# Patient Record
Sex: MALE | Race: WHITE | Employment: FULL TIME | URBAN - METROPOLITAN AREA
[De-identification: names, ages, dates, MRNs, and addresses within clinical notes are randomized per-mention and may not be internally consistent; named-entity substitution may affect disease eponyms.]

---

## 2018-04-10 ENCOUNTER — APPOINTMENT (OUTPATIENT)
Dept: MRI IMAGING | Age: 48
End: 2018-04-10
Attending: INTERNAL MEDICINE
Payer: COMMERCIAL

## 2018-04-10 ENCOUNTER — APPOINTMENT (OUTPATIENT)
Dept: GENERAL RADIOLOGY | Age: 48
End: 2018-04-10
Attending: EMERGENCY MEDICINE
Payer: COMMERCIAL

## 2018-04-10 ENCOUNTER — APPOINTMENT (OUTPATIENT)
Dept: CT IMAGING | Age: 48
End: 2018-04-10
Attending: INTERNAL MEDICINE
Payer: COMMERCIAL

## 2018-04-10 ENCOUNTER — APPOINTMENT (OUTPATIENT)
Dept: CT IMAGING | Age: 48
End: 2018-04-10
Attending: EMERGENCY MEDICINE
Payer: COMMERCIAL

## 2018-04-10 ENCOUNTER — HOSPITAL ENCOUNTER (OUTPATIENT)
Age: 48
Setting detail: OBSERVATION
Discharge: HOME | End: 2018-04-11
Attending: EMERGENCY MEDICINE | Admitting: INTERNAL MEDICINE
Payer: COMMERCIAL

## 2018-04-10 DIAGNOSIS — R20.0 RIGHT FACIAL NUMBNESS: ICD-10-CM

## 2018-04-10 DIAGNOSIS — R29.810 FACIAL WEAKNESS: Primary | ICD-10-CM

## 2018-04-10 PROBLEM — G45.9 TIA (TRANSIENT ISCHEMIC ATTACK): Status: ACTIVE | Noted: 2018-04-10

## 2018-04-10 PROBLEM — K21.9 GERD (GASTROESOPHAGEAL REFLUX DISEASE): Status: ACTIVE | Noted: 2018-04-10

## 2018-04-10 PROBLEM — K22.719 BARRETT'S ESOPHAGUS WITH DYSPLASIA: Status: ACTIVE | Noted: 2018-04-10

## 2018-04-10 LAB
ALBUMIN SERPL-MCNC: 3.4 G/DL (ref 3.5–5)
ALBUMIN/GLOB SERPL: 0.9 {RATIO} (ref 1.2–3.5)
ALP SERPL-CCNC: 114 U/L (ref 50–136)
ALT SERPL-CCNC: 25 U/L (ref 12–65)
ANION GAP SERPL CALC-SCNC: 10 MMOL/L (ref 7–16)
AST SERPL-CCNC: 19 U/L (ref 15–37)
ATRIAL RATE: 75 BPM
BASOPHILS # BLD: 0 K/UL (ref 0–0.2)
BASOPHILS NFR BLD: 0 % (ref 0–2)
BILIRUB SERPL-MCNC: 0.2 MG/DL (ref 0.2–1.1)
BUN SERPL-MCNC: 10 MG/DL (ref 6–23)
CALCIUM SERPL-MCNC: 8.5 MG/DL (ref 8.3–10.4)
CALCULATED P AXIS, ECG09: 70 DEGREES
CALCULATED R AXIS, ECG10: 83 DEGREES
CALCULATED T AXIS, ECG11: 128 DEGREES
CHLORIDE SERPL-SCNC: 105 MMOL/L (ref 98–107)
CO2 SERPL-SCNC: 26 MMOL/L (ref 21–32)
CREAT SERPL-MCNC: 1.08 MG/DL (ref 0.8–1.5)
DIAGNOSIS, 93000: NORMAL
DIFFERENTIAL METHOD BLD: ABNORMAL
EOSINOPHIL # BLD: 0.2 K/UL (ref 0–0.8)
EOSINOPHIL NFR BLD: 2 % (ref 0.5–7.8)
ERYTHROCYTE [DISTWIDTH] IN BLOOD BY AUTOMATED COUNT: 12.9 % (ref 11.9–14.6)
GLOBULIN SER CALC-MCNC: 3.8 G/DL (ref 2.3–3.5)
GLUCOSE SERPL-MCNC: 114 MG/DL (ref 65–100)
HCT VFR BLD AUTO: 44.3 % (ref 41.1–50.3)
HGB BLD-MCNC: 14.4 G/DL (ref 13.6–17.2)
IMM GRANULOCYTES # BLD: 0.1 K/UL (ref 0–0.5)
IMM GRANULOCYTES NFR BLD AUTO: 1 % (ref 0–5)
INR PPP: 0.9
LYMPHOCYTES # BLD: 2.9 K/UL (ref 0.5–4.6)
LYMPHOCYTES NFR BLD: 30 % (ref 13–44)
MCH RBC QN AUTO: 27.4 PG (ref 26.1–32.9)
MCHC RBC AUTO-ENTMCNC: 32.5 G/DL (ref 31.4–35)
MCV RBC AUTO: 84.2 FL (ref 79.6–97.8)
MONOCYTES # BLD: 0.6 K/UL (ref 0.1–1.3)
MONOCYTES NFR BLD: 6 % (ref 4–12)
NEUTS SEG # BLD: 6 K/UL (ref 1.7–8.2)
NEUTS SEG NFR BLD: 61 % (ref 43–78)
P-R INTERVAL, ECG05: 162 MS
PLATELET # BLD AUTO: 267 K/UL (ref 150–450)
PMV BLD AUTO: 10.4 FL (ref 10.8–14.1)
POTASSIUM SERPL-SCNC: 3.8 MMOL/L (ref 3.5–5.1)
PROT SERPL-MCNC: 7.2 G/DL (ref 6.3–8.2)
PROTHROMBIN TIME: 12.6 SEC (ref 11.5–14.5)
Q-T INTERVAL, ECG07: 372 MS
QRS DURATION, ECG06: 86 MS
QTC CALCULATION (BEZET), ECG08: 415 MS
RBC # BLD AUTO: 5.26 M/UL (ref 4.23–5.67)
SODIUM SERPL-SCNC: 141 MMOL/L (ref 136–145)
VENTRICULAR RATE, ECG03: 75 BPM
WBC # BLD AUTO: 9.8 K/UL (ref 4.3–11.1)

## 2018-04-10 PROCEDURE — A9577 INJ MULTIHANCE: HCPCS | Performed by: EMERGENCY MEDICINE

## 2018-04-10 PROCEDURE — 74011636320 HC RX REV CODE- 636/320: Performed by: EMERGENCY MEDICINE

## 2018-04-10 PROCEDURE — 70498 CT ANGIOGRAPHY NECK: CPT

## 2018-04-10 PROCEDURE — 77030019605

## 2018-04-10 PROCEDURE — 74011250636 HC RX REV CODE- 250/636: Performed by: EMERGENCY MEDICINE

## 2018-04-10 PROCEDURE — 85610 PROTHROMBIN TIME: CPT | Performed by: EMERGENCY MEDICINE

## 2018-04-10 PROCEDURE — 85025 COMPLETE CBC W/AUTO DIFF WBC: CPT | Performed by: EMERGENCY MEDICINE

## 2018-04-10 PROCEDURE — 99218 HC RM OBSERVATION: CPT

## 2018-04-10 PROCEDURE — 74011250637 HC RX REV CODE- 250/637: Performed by: INTERNAL MEDICINE

## 2018-04-10 PROCEDURE — 70450 CT HEAD/BRAIN W/O DYE: CPT

## 2018-04-10 PROCEDURE — 74011000258 HC RX REV CODE- 258: Performed by: EMERGENCY MEDICINE

## 2018-04-10 PROCEDURE — 86618 LYME DISEASE ANTIBODY: CPT | Performed by: PSYCHIATRY & NEUROLOGY

## 2018-04-10 PROCEDURE — 99285 EMERGENCY DEPT VISIT HI MDM: CPT | Performed by: EMERGENCY MEDICINE

## 2018-04-10 PROCEDURE — 71046 X-RAY EXAM CHEST 2 VIEWS: CPT

## 2018-04-10 PROCEDURE — 80053 COMPREHEN METABOLIC PANEL: CPT | Performed by: EMERGENCY MEDICINE

## 2018-04-10 PROCEDURE — 93005 ELECTROCARDIOGRAM TRACING: CPT | Performed by: EMERGENCY MEDICINE

## 2018-04-10 PROCEDURE — 70553 MRI BRAIN STEM W/O & W/DYE: CPT

## 2018-04-10 RX ORDER — TRAMADOL HYDROCHLORIDE 50 MG/1
50 TABLET ORAL
COMMUNITY

## 2018-04-10 RX ORDER — ASPIRIN 325 MG
325 TABLET ORAL DAILY
Status: DISCONTINUED | OUTPATIENT
Start: 2018-04-10 | End: 2018-04-11 | Stop reason: HOSPADM

## 2018-04-10 RX ORDER — SODIUM CHLORIDE 0.9 % (FLUSH) 0.9 %
10 SYRINGE (ML) INJECTION
Status: COMPLETED | OUTPATIENT
Start: 2018-04-10 | End: 2018-04-10

## 2018-04-10 RX ORDER — SODIUM CHLORIDE 0.9 % (FLUSH) 0.9 %
5-10 SYRINGE (ML) INJECTION EVERY 8 HOURS
Status: DISCONTINUED | OUTPATIENT
Start: 2018-04-10 | End: 2018-04-11 | Stop reason: HOSPADM

## 2018-04-10 RX ORDER — ATORVASTATIN CALCIUM 40 MG/1
40 TABLET, FILM COATED ORAL DAILY
Status: DISCONTINUED | OUTPATIENT
Start: 2018-04-10 | End: 2018-04-11 | Stop reason: HOSPADM

## 2018-04-10 RX ORDER — IBUPROFEN 200 MG
1 TABLET ORAL DAILY
Status: DISCONTINUED | OUTPATIENT
Start: 2018-04-10 | End: 2018-04-11 | Stop reason: HOSPADM

## 2018-04-10 RX ORDER — HEPARIN SODIUM 5000 [USP'U]/ML
5000 INJECTION, SOLUTION INTRAVENOUS; SUBCUTANEOUS EVERY 12 HOURS
Status: DISCONTINUED | OUTPATIENT
Start: 2018-04-10 | End: 2018-04-11 | Stop reason: HOSPADM

## 2018-04-10 RX ORDER — PANTOPRAZOLE SODIUM 40 MG/1
40 TABLET, DELAYED RELEASE ORAL
Status: DISCONTINUED | OUTPATIENT
Start: 2018-04-11 | End: 2018-04-11 | Stop reason: HOSPADM

## 2018-04-10 RX ORDER — SODIUM CHLORIDE 0.9 % (FLUSH) 0.9 %
5-10 SYRINGE (ML) INJECTION AS NEEDED
Status: DISCONTINUED | OUTPATIENT
Start: 2018-04-10 | End: 2018-04-11 | Stop reason: HOSPADM

## 2018-04-10 RX ADMIN — SODIUM CHLORIDE 100 ML: 900 INJECTION, SOLUTION INTRAVENOUS at 18:52

## 2018-04-10 RX ADMIN — GADOBENATE DIMEGLUMINE 20 ML: 529 INJECTION, SOLUTION INTRAVENOUS at 19:58

## 2018-04-10 RX ADMIN — ASPIRIN 325 MG ORAL TABLET 325 MG: 325 PILL ORAL at 21:50

## 2018-04-10 RX ADMIN — ATORVASTATIN CALCIUM 40 MG: 40 TABLET, FILM COATED ORAL at 20:26

## 2018-04-10 RX ADMIN — Medication 10 ML: at 19:58

## 2018-04-10 RX ADMIN — Medication 10 ML: at 21:51

## 2018-04-10 RX ADMIN — Medication 10 ML: at 18:52

## 2018-04-10 RX ADMIN — IOPAMIDOL 80 ML: 755 INJECTION, SOLUTION INTRAVENOUS at 18:52

## 2018-04-10 NOTE — IP AVS SNAPSHOT
Summary of Care Report The Summary of Care report has been created to help improve care coordination. Users with access to [facility] or [address] (Web-based application) may access additional patient information including the Discharge Summary. If you are not currently a [address] user and need more information, please call the number listed below in the [place] [address] section and ask to be connected with Medical Records. Facility Information Name Address Phone [address] [place] [address] [address] [place] [id number] [phone] Patient Information Patient Name Sex Warren Huston ROXANA Sr. ([id number]) Male 1970 Discharge Information Admitting Provider Service Area Unit Karen Dowling MD / [address] [place] [address] Intensive Care / [phone] Discharge Provider Discharge Date/Time Discharge Disposition Destination (none) 2018 (Pending) AHR (none) Patient Language Language ENGLISH [13] Hospital Problems as of 2018  Reviewed: 2018 12:26 PM by Karen Dowling MD  
  
  
  
 Class Noted - Resolved Last Modified POA Active Problems Munoz's esophagus with dysplasia  4/10/2018 - Present 4/10/2018 by Karen Dowling MD Unknown Entered by Karen Dowling MD  
  Bell's palsy  2018 - Present 2018 by Karen Dowling MD Unknown Entered by Karen Dowling MD  
  
Non-Hospital Problems as of 2018  Reviewed: 2018 12:26 PM by Karen Dowling MD  
  
  
  
 Class Noted - Resolved Last Modified Active Problems GERD (gastroesophageal reflux disease)  4/10/2018 - Present 2018 by Karen Dowling MD  
  Entered by Karen Dowling MD  
  
You are allergic to the following No active allergies Current Discharge Medication List  
  
START taking these medications Dose & Instructions Dispensing Information Comments acyclovir 200 mg capsule Commonly known as:  ZOVIRAX Dose:  200 mg Take 1 Cap by mouth five (5) times daily for 10 days. Quantity:  50 Cap Refills:  0  
   
 aspirin 81 mg chewable tablet Dose:  81 mg Take 1 Tab by mouth daily. Quantity:  30 Tab Refills:  0  
   
 atorvastatin 40 mg tablet Commonly known as:  LIPITOR Start taking on:  4/12/2018 Dose:  40 mg Take 1 Tab by mouth daily. Quantity:  30 Tab Refills:  0  
   
 polyvinyl alcohol 1.4 % ophthalmic solution Commonly known as:  Varinder Jackman Dose:  1 Drop Administer 1 Drop to both eyes as needed for up to 30 days. Quantity:  50 mL Refills:  0  
   
 predniSONE 20 mg tablet Commonly known as:  Devan Sewell Prednisolone (60 mg daily for five days, then taper by 10 mg daily, for a total treatment length of 10 days) Quantity:  24 Tab Refills:  0  
   
 white petrolatum-mineral oil 56.8-42.5 % ointment Commonly known as:  LACRILUBE S.O.P. Dose:  1 Each Administer 1 Each to both eyes every twelve (12) hours. Quantity:  1 Tube Refills:  1 CONTINUE these medications which have NOT CHANGED Dose & Instructions Dispensing Information Comments PriLOSEC 10 mg capsule Generic drug:  omeprazole Dose:  20 mg Take 20 mg by mouth two (2) times a day. Refills:  0  
   
 traMADol 50 mg tablet Commonly known as:  ULTRAM  
 Dose:  50 mg Take 50 mg by mouth every six (6) hours as needed for Pain. Refills:  0 STOP taking these medications Comments  
 ibuprofen 100 mg tablet Lortab 7.5 7.5-500 mg per tablet Generic drug:  HYDROcodone-acetaminophen ASK your doctor about these medications Dose & Instructions Dispensing Information Comments ZANAFLEX 4 mg capsule Generic drug:  tiZANidine Dose:  8 mg Take 8 mg by mouth two (2) times daily as needed. Refills:  0  
   
 ZANTAC 150 mg tablet Generic drug:  raNITIdine Dose:  150 mg Take 150 mg by mouth two (2) times a day. Refills:  0 Follow-up Information Follow up With Details Comments Contact Info Not On File [facility] In 1 week Hospital f/u. Bell's palsy dx. Keep appointment with Dr. Andrés Yang next week. Not On File (58) Patient has a PCP but that physician is not listed in [facility]. Discharge Instructions Bell's Palsy: Care Instructions Your Care Instructions Bell's palsy is paralysis or weakness of the muscles on one side of the face. Often people with Bell's palsy have a droop on one side of the mouth and have trouble completely shutting the eye on the same side. Bell's palsy can also interfere with the sense of taste. These things happen when a nerve in your face becomes inflamed. Bell's palsy is not caused by a stroke. The cause of the nerve inflammation is not known. But some experts think that a virus may cause it. Because of this, doctors sometimes prescribe antiviral medicine to treat it. You also may get medicine to reduce swelling. Bell's palsy usually gets better on its own in a few weeks or months. Follow-up care is a key part of your treatment and safety. Be sure to make and go to all appointments, and call your doctor if you are having problems. It's also a good idea to know your test results and keep a list of the medicines you take. How can you care for yourself at home? · Take your medicines exactly as prescribed. Call your doctor if you think you are having a problem with your medicine. You will get more details on the specific medicines your doctor prescribes. · Use artificial tears or ointment if your eyes are too dry. Bell's palsy can make your lower eyelid droop, causing a dry eye. · If you cannot completely close your eye, consider using an eye patch while you sleep. · Help yourself blink by using your finger to close and open your eyelid.  This may help keep your eye moist. 
 · Wear glasses or goggles to keep dust and dirt out of your eye. · As feeling comes back to your face, massage your forehead, cheeks, and lips. Massage may make the muscles in your face stronger. · Brush and floss your teeth often to help prevent tooth decay. Bell's palsy can dry up the spit on one side of your mouth. This increases the risk of tooth decay. When should you call for help? Call 911 anytime you think you may need emergency care. For example, call if: 
? · You have symptoms of a stroke. These may include: 
¨ Sudden numbness, tingling, weakness, or loss of movement in your face, arm, or leg, especially on only one side of your body. ¨ Sudden vision changes. ¨ Sudden trouble speaking. ¨ Sudden confusion or trouble understanding simple statements. ¨ Sudden problems with walking or balance. ¨ A sudden, severe headache that is different from past headaches. ?Call your doctor now or seek immediate medical care if: 
? · You have numbness or weakness that spreads beyond one side of your face. ? · You have a skin rash or eye pain or redness, or light bothers your eyes. ? · You have a new or worse headache. ? Watch closely for changes in your health, and be sure to contact your doctor if: 
? · You do not get better as expected. Where can you learn more? Go to http://tramaine-sue.info/. Enter P168 in the search box to learn more about \"Bell's Palsy: Care Instructions. \" Current as of: October 14, 2016 Content Version: 11.4 © 4141-0684 [employer]. Care instructions adapted under license by [employer] (which disclaims liability or warranty for this information). If you have questions about a medical condition or this instruction, always ask your healthcare professional. [place] [address] any warranty or liability for your use of this information. Transient Ischemic Attack: Care Instructions Your Care Instructions A transient ischemic attack (TIA) is when blood flow to a part of your brain is blocked for a short time. A TIA is like a stroke but usually lasts only a few minutes. A TIA does not cause lasting brain damage. Any vision problems, slurred speech, or other symptoms usually go away in 10 to 20 minutes. But they may last for up to 24 hours. TIAs are often warning signs of a stroke. Some people who have a TIA may have a stroke in the future. A stroke can cause symptoms like those of a TIA. But a stroke causes lasting damage to your brain. You can take steps to help prevent a stroke. One thing you can do is get early treatment. If you have other new symptoms, or if your symptoms do not get better, go back to the emergency room or call your doctor right away. Getting treatment right away may prevent long-term brain damage caused by a stroke. The doctor has checked you carefully, but problems can develop later. If you notice any problems or new symptoms, get medical treatment right away. Follow-up care is a key part of your treatment and safety. Be sure to make and go to all appointments, and call your doctor if you are having problems. It's also a good idea to know your test results and keep a list of the medicines you take. How can you care for yourself at home? Medicines ? · Be safe with medicines. Take your medicines exactly as prescribed. Call your doctor if you think you are having a problem with your medicine. ? · If you take a blood thinner, such as aspirin, be sure you get instructions about how to take your medicine safely. Blood thinners can cause serious bleeding problems. ? · Call your doctor if you are not able to take your medicines for any reason. ? · Do not take any over-the-counter medicines or herbal products without talking to your doctor first.  
? · If you take birth control pills or hormone therapy, talk to your doctor. Ask if these treatments are right for you. ? Lifestyle changes ? · Do not smoke. If you need help quitting, talk to your doctor about stop-smoking programs and medicines. ? · Be active. If your doctor recommends it, get more exercise. Walking is a good choice. Bit by bit, increase the amount you walk every day. Try for at least 30 minutes on most days of the week. You also may want to swim, bike, or do other activities. ? · Eat heart-healthy foods. These include fruits, vegetables, high-fiber foods, fish, and foods that are low in sodium, saturated fat, and trans fat. ? · Stay at a healthy weight. Lose weight if you need to.  
? · Limit alcohol to 2 drinks a day for men and 1 drink a day for women. ?Staying healthy ? · Manage other health problems such as diabetes, high blood pressure, and high cholesterol. ? · Get the flu vaccine every year. When should you call for help? Call 911 anytime you think you may need emergency care. For example, call if: 
? · You have new or worse symptoms of a stroke. These may include: 
¨ Sudden numbness, tingling, weakness, or loss of movement in your face, arm, or leg, especially on only one side of your body. ¨ Sudden vision changes. ¨ Sudden trouble speaking. ¨ Sudden confusion or trouble understanding simple statements. ¨ Sudden problems with walking or balance. ¨ A sudden, severe headache that is different from past headaches. Call 911 even if these symptoms go away in a few minutes. ? · You feel like you are having another TIA. ? Watch closely for changes in your health, and be sure to contact your doctor if you have any problems. Where can you learn more? Go to http://tramaine-sue.info/. Enter [phone] [id number] [id number] in the search box to learn more about \"Transient Ischemic Attack: Care Instructions. \" Current as of: March 20, 2017 Content Version: 11.4 © 3493-5174 [employer].  Care instructions adapted under license by [employer] (which disclaims liability or warranty for this information). If you have questions about a medical condition or this instruction, always ask your healthcare professional. [address] any warranty or liability for your use of this information. DISCHARGE SUMMARY from Nurse PATIENT INSTRUCTIONS: 
 
 
F-face looks uneven A-arms unable to move or move unevenly S-speech slurred or non-existent T-time-call 911 as soon as signs and symptoms begin-DO NOT go Back to bed or wait to see if you get better-TIME IS BRAIN. Warning Signs of HEART ATTACK Call 911 if you have these symptoms: 
? Chest discomfort. Most heart attacks involve discomfort in the center of the chest that lasts more than a few minutes, or that goes away and comes back. It can feel like uncomfortable pressure, squeezing, fullness, or pain. ? Discomfort in other areas of the upper body. Symptoms can include pain or discomfort in one or both arms, the back, neck, jaw, or stomach. ? Shortness of breath with or without chest discomfort. ? Other signs may include breaking out in a cold sweat, nausea, or lightheadedness. Don't wait more than five minutes to call [address]! Fast action can save your life. Calling 911 is almost always the fastest way to get lifesaving treatment. Emergency Medical Services staff can begin treatment when they arrive  up to an hour sooner than if someone gets to the hospital by car. The discharge information has been reviewed with the patient. The patient verbalized understanding. Discharge medications reviewed with the patient and appropriate educational materials and side effects teaching were provided. ___________________________________________________________________________________________________________________________________ Chart Review Routing History No Routing History on File

## 2018-04-10 NOTE — ED TRIAGE NOTES
Patient arrives from work with complaint of right sided facial numbness and facial muscle weakness that was present when he woke up this morning at 02:00 a.m. 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. He also complains of pain behind his right ear. Patient displays with right sided deficit to face. No other neurological deficits present.

## 2018-04-10 NOTE — ED PROVIDER NOTES
HPI Comments: Patient with history of heart disease. 2 days ago started with right-sided tongue numbness. It has slowly spread to the right side of his face. Symptoms were more severe when he woke at 2 AM this morning. He denies any numbness or weakness in his arms or legs. No blurry vision or headache. Does have some right posterior ear pain. Denies any slurred speech or confusion. States this morning when he is trying to eat food was falling of his mouth. Patient is a 41 y.o. male presenting with numbness. The history is provided by the patient. No  was used. Numbness   This is a new problem. The current episode started 2 days ago. The problem has been gradually worsening. There was right facial focality noted. Primary symptoms include loss of sensation. Pertinent negatives include no focal weakness, no loss of balance, no slurred speech, no speech difficulty, no memory loss, no movement disorder, no agitation, no visual change, no auditory change, no mental status change, no unresponsiveness and no disorientation. There has been no fever. Pertinent negatives include no shortness of breath, no chest pain, no vomiting, no altered mental status, no confusion, no headaches, no nausea, no bowel incontinence and no bladder incontinence. Past Medical History:   Diagnosis Date    CAD (coronary artery disease)     Gastrointestinal disorder     gerd    Other ill-defined conditions(229.07)     slipped disk.  Psychiatric disorder     anxiety       Past Surgical History:   Procedure Laterality Date    HX ORTHOPAEDIC      bilateral knee, right hand         History reviewed. No pertinent family history. Social History     Social History    Marital status: LEGALLY      Spouse name: N/A    Number of children: N/A    Years of education: N/A     Occupational History    Not on file.      Social History Main Topics    Smoking status: Current Every Day Smoker     Packs/day: 1.50    Smokeless tobacco: Never Used    Alcohol use No    Drug use: No    Sexual activity: Yes     Other Topics Concern    Not on file     Social History Narrative         ALLERGIES: Review of patient's allergies indicates no known allergies. Review of Systems   Constitutional: Negative for chills and fever. HENT: Positive for ear pain. Negative for rhinorrhea and sore throat. Eyes: Negative for pain and redness. Respiratory: Negative for chest tightness, shortness of breath and wheezing. Cardiovascular: Negative for chest pain and leg swelling. Gastrointestinal: Negative for abdominal pain, bowel incontinence, diarrhea, nausea and vomiting. Genitourinary: Negative for bladder incontinence, dysuria and hematuria. Musculoskeletal: Negative for back pain, gait problem, neck pain and neck stiffness. Skin: Negative for color change and rash. Neurological: Positive for facial asymmetry and numbness. Negative for focal weakness, speech difficulty, weakness, headaches and loss of balance. Psychiatric/Behavioral: Negative for agitation, confusion and memory loss. Vitals:    04/10/18 1533   BP: 134/83   Pulse: (!) 102   Resp: 18   Temp: 97.9 °F (36.6 °C)   SpO2: 97%   Weight: 105.7 kg (233 lb)   Height: 5' 10\" (1.778 m)            Physical Exam   Constitutional: He is oriented to person, place, and time. He appears well-developed and well-nourished. HENT:   Head: Normocephalic and atraumatic. Right Ear: External ear normal.   Left Ear: External ear normal.   Mouth/Throat: Oropharynx is clear and moist.   Eyes: Conjunctivae and EOM are normal. Pupils are equal, round, and reactive to light. Neck: Normal range of motion. Neck supple. Cardiovascular: Normal rate and regular rhythm. No murmur heard. Pulmonary/Chest: Effort normal and breath sounds normal. He has no wheezes. Abdominal: Soft. Bowel sounds are normal. There is no tenderness.    Musculoskeletal: Normal range of motion. He exhibits no edema. Neurological: He is alert and oriented to person, place, and time. A cranial nerve deficit (right facial droop and weakness but no involvement of forehead. ) is present. He exhibits normal muscle tone. Coordination normal.   Skin: Skin is warm and dry. Nursing note and vitals reviewed. MDM  Number of Diagnoses or Management Options  Diagnosis management comments: CT negative. No forehead involvement. Will admit for further stroke workup. Amount and/or Complexity of Data Reviewed  Clinical lab tests: ordered and reviewed  Tests in the radiology section of CPT®: ordered and reviewed    Patient Progress  Patient progress: stable        ED Course       Procedures            CT HEAD WO CONT (Final result) Result time: 04/10/18 16:18:20     Final result by Rosy Lee MD (04/10/18 16:18:20)     Impression:     IMPRESSION:  No acute CT findings in the brain.           Narrative:     CT SCAN OF THE BRAIN    4/10/2018    Indication:  Right facial numbness    TECHNIQUE:  Axial scans from skull base to vertex.  No contrast.  Radiation dose  reduction techniques were used for this study:  All CT scans performed at this  facility use one or all of the following: Automated exposure control, adjustment  of the mA and/or kVp according to patient's size, iterative reconstruction. COMPARISON: Alan Gens are no recent previous comparable exams on this PACs system. Findings: Alan Gens is no evidence for mass, acute hemorrhage, or midline shift of  the brain.  Ventricles are normal size.  No abnormal fluid collection is  identified.  No definite evidence for acute major arterial territory infarct. Skull appears intact.  Mucosal thickening noted paranasal sinuses.                 XR CHEST PA LAT (Final result) Result time: 04/10/18 16:09:32     Final result by Alem Palacio DO (04/10/18 16:09:32)     Impression:     Impression: No active disease in the chest.           Narrative:     Two view chest:  04/10/2018    History: Acute right facial numbness. Comparison: None    Findings: The heart and mediastinal silhouette are normal in size and  configuration. The lungs and pleural spaces are clear. The pulmonary vascularity  is within normal limits. The visualized osseous structures are unremarkable.           Results Include:    Recent Results (from the past 24 hour(s))   CBC WITH AUTOMATED DIFF    Collection Time: 04/10/18  4:36 PM   Result Value Ref Range    WBC 9.8 4.3 - 11.1 K/uL    RBC 5.26 4.23 - 5.67 M/uL    HGB 14.4 13.6 - 17.2 g/dL    HCT 44.3 41.1 - 50.3 %    MCV 84.2 79.6 - 97.8 FL    MCH 27.4 26.1 - 32.9 PG    MCHC 32.5 31.4 - 35.0 g/dL    RDW 12.9 11.9 - 14.6 %    PLATELET 394 696 - 020 K/uL    MPV 10.4 (L) 10.8 - 14.1 FL    DF AUTOMATED      NEUTROPHILS 61 43 - 78 %    LYMPHOCYTES 30 13 - 44 %    MONOCYTES 6 4.0 - 12.0 %    EOSINOPHILS 2 0.5 - 7.8 %    BASOPHILS 0 0.0 - 2.0 %    IMMATURE GRANULOCYTES 1 0.0 - 5.0 %    ABS. NEUTROPHILS 6.0 1.7 - 8.2 K/UL    ABS. LYMPHOCYTES 2.9 0.5 - 4.6 K/UL    ABS. MONOCYTES 0.6 0.1 - 1.3 K/UL    ABS. EOSINOPHILS 0.2 0.0 - 0.8 K/UL    ABS. BASOPHILS 0.0 0.0 - 0.2 K/UL    ABS. IMM. GRANS. 0.1 0.0 - 0.5 K/UL   METABOLIC PANEL, COMPREHENSIVE    Collection Time: 04/10/18  4:36 PM   Result Value Ref Range    Sodium 141 136 - 145 mmol/L    Potassium 3.8 3.5 - 5.1 mmol/L    Chloride 105 98 - 107 mmol/L    CO2 26 21 - 32 mmol/L    Anion gap 10 7 - 16 mmol/L    Glucose 114 (H) 65 - 100 mg/dL    BUN 10 6 - 23 MG/DL    Creatinine 1.08 0.8 - 1.5 MG/DL    GFR est AA >60 >60 ml/min/1.73m2    GFR est non-AA >60 >60 ml/min/1.73m2    Calcium 8.5 8.3 - 10.4 MG/DL    Bilirubin, total 0.2 0.2 - 1.1 MG/DL    ALT (SGPT) 25 12 - 65 U/L    AST (SGOT) 19 15 - 37 U/L    Alk.  phosphatase 114 50 - 136 U/L    Protein, total 7.2 6.3 - 8.2 g/dL    Albumin 3.4 (L) 3.5 - 5.0 g/dL    Globulin 3.8 (H) 2.3 - 3.5 g/dL    A-G Ratio 0.9 (L) 1.2 - 3.5     PROTHROMBIN TIME + INR    Collection Time: 04/10/18  4:36 PM   Result Value Ref Range    Prothrombin time 12.6 11.5 - 14.5 sec    INR 0.9

## 2018-04-10 NOTE — IP AVS SNAPSHOT
[address] [facility] 
 
 
 [address] [id number] [facility] 
[phone] Patient: Ray Chang. MRN: [id number] NOU:[id number] About your hospitalization You were admitted on:  April 10, 2018 You last received care in the:  [facility] 3 ICU You were discharged on:  April 11, 2018 Why you were hospitalized Your primary diagnosis was:  Not on File Your diagnoses also included:  Munoz's Esophagus With Dysplasia, Bell's Palsy Follow-up Information Follow up With Details Comments Contact Info Not On File B[facility] In 1 week Hospital f/u. Bell's palsy dx. Keep appointment with Dr. Gabby Ferraro next week. Not On File (58) Patient has a PCP but that physician is not listed in [address] [place] [address]. Discharge Orders None A check checo indicates which time of day the medication should be taken. My Medications START taking these medications Instructions Each Dose to Equal  
 Morning Noon Evening Bedtime  
 acyclovir 200 mg capsule Commonly known as:  ZOVIRAX Your last dose was: Your next dose is: Take 1 Cap by mouth five (5) times daily for 10 days. 200 mg  
    
   
   
   
  
 aspirin 81 mg chewable tablet Your last dose was: Your next dose is: Take 1 Tab by mouth daily. 81 mg  
    
   
   
   
  
 atorvastatin 40 mg tablet Commonly known as:  LIPITOR Start taking on:  4/12/2018 Your last dose was: Your next dose is: Take 1 Tab by mouth daily. 40 mg  
    
   
   
   
  
 polyvinyl alcohol 1.4 % ophthalmic solution Commonly known as:  Kierra Marizol Your last dose was: Your next dose is:    
   
   
 Administer 1 Drop to both eyes as needed for up to 30 days. 1 Drop  
    
   
   
   
  
 predniSONE 20 mg tablet Commonly known as:  Jaime Noss Your last dose was: Your next dose is: Prednisolone (60 mg daily for five days, then taper by 10 mg daily, for a total treatment length of 10 days)  
     
   
   
   
  
 white petrolatum-mineral oil 56.8-42.5 % ointment Commonly known as:  LACRILUBE S.O.P. Your last dose was: Your next dose is:    
   
   
 Administer 1 Each to both eyes every twelve (12) hours. 1 Each CONTINUE taking these medications Instructions Each Dose to Equal  
 Morning Noon Evening Bedtime PriLOSEC 10 mg capsule Generic drug:  omeprazole Your last dose was: Your next dose is: Take 20 mg by mouth two (2) times a day. 20 mg  
    
   
   
   
  
 traMADol 50 mg tablet Commonly known as:  ULTRAM  
   
Your last dose was: Your next dose is: Take 50 mg by mouth every six (6) hours as needed for Pain. 50 mg  
    
   
   
   
  
  
STOP taking these medications   
 ibuprofen 100 mg tablet Lortab 7.5 7.5-500 mg per tablet Generic drug:  HYDROcodone-acetaminophen ASK your doctor about these medications Instructions Each Dose to Equal  
 Morning Noon Evening Bedtime ZANAFLEX 4 mg capsule Generic drug:  tiZANidine Your last dose was: Your next dose is: Take 8 mg by mouth two (2) times daily as needed. 8 mg ZANTAC 150 mg tablet Generic drug:  raNITIdine Your last dose was: Your next dose is: Take 150 mg by mouth two (2) times a day. 150 mg Where to Get Your Medications Information on where to get these meds will be given to you by the nurse or doctor. ! Ask your nurse or doctor about these medications  
  acyclovir 200 mg capsule  
 aspirin 81 mg chewable tablet  
 atorvastatin 40 mg tablet  
 polyvinyl alcohol 1.4 % ophthalmic solution  
 predniSONE 20 mg tablet  
 white petrolatum-mineral oil 56.8-42.5 % ointment Opioid Education Prescription Opioids: What You Need to Know: 
 
Prescription opioids can be used to help relieve moderate-to-severe pain and are often prescribed following a surgery or injury, or for certain health conditions. These medications can be an important part of treatment but also come with serious risks. Opioids are strong pain medicines. Examples include hydrocodone, oxycodone, fentanyl, and morphine. Heroin is an example of an illegal opioid. It is important to work with your health care provider to make sure you are getting the safest, most effective care. WHAT ARE THE RISKS AND SIDE EFFECTS OF OPIOID USE? Prescription opioids carry serious risks of addiction and overdose, especially with prolonged use. An opioid overdose, often marked by slow breathing, can cause sudden death. The use of prescription opioids can have a number of side effects as well, even when taken as directed. · Tolerance-meaning you might need to take more of a medication for the same pain relief · Physical dependence-meaning you have symptoms of withdrawal when the medication is stopped. Withdrawal symptoms can include nausea, sweating, chills, diarrhea, stomach cramps, and muscle aches. Withdrawal can last up to several weeks, depending on which drug you took and how long you took it. · Increased sensitivity to pain · Constipation · Nausea, vomiting, and dry mouth · Sleepiness and dizziness · Confusion · Depression · Low levels of testosterone that can result in lower sex drive, energy, and strength · Itching and sweating RISKS ARE GREATER WITH:      
· History of drug misuse, substance use disorder, or overdose · Mental health conditions (such as depression or anxiety) · Sleep apnea · Older age (72 years or older) · Pregnancy Avoid alcohol while taking prescription opioids. Also, unless specifically advised by your health care provider, medications to avoid include: · Benzodiazepines (such as Xanax or Valium) · Muscle relaxants (such as Soma or Flexeril) · Hypnotics (such as Ambien or Lunesta) · Other prescription opioids KNOW YOUR OPTIONS Talk to your health care provider about ways to manage your pain that don't involve prescription opioids. Some of these options may actually work better and have fewer risks and side effects. Options may include: 
· Pain relievers such as acetaminophen, ibuprofen, and naproxen · Some medications that are also used for depression or seizures · Physical therapy and exercise · Counseling to help patients learn how to cope better with triggers of pain and stress. · Application of heat or cold compress · Massage therapy · Relaxation techniques Be Informed Make sure you know the name of your medication, how much and how often to take it, and its potential risks & side effects. IF YOU ARE PRESCRIBED OPIOIDS FOR PAIN: 
· Never take opioids in greater amounts or more often than prescribed. Remember the goal is not to be pain-free but to manage your pain at a tolerable level. · Follow up with your primary care provider to: · Work together to create a plan on how to manage your pain. · Talk about ways to help manage your pain that don't involve prescription opioids. · Talk about any and all concerns and side effects. · Help prevent misuse and abuse. · Never sell or share prescription opioids · Help prevent misuse and abuse. · Store prescription opioids in a secure place and out of reach of others (this may include visitors, children, friends, and family). · Safely dispose of unused/unwanted prescription opioids: Find your community drug take-back program or your pharmacy mail-back program, or flush them down the toilet, following guidance from the Food and Drug Administration (www.fda.gov/Drugs/ResourcesForYou). · Visit www.cdc.gov/drugoverdose to learn about the risks of opioid abuse and overdose. · If you believe you may be struggling with addiction, tell your health care provider and ask for guidance or call [employer] at 8-435-140-IGDB. Discharge Instructions Bell's Palsy: Care Instructions Your Care Instructions Bell's palsy is paralysis or weakness of the muscles on one side of the face. Often people with Bell's palsy have a droop on one side of the mouth and have trouble completely shutting the eye on the same side. Bell's palsy can also interfere with the sense of taste. These things happen when a nerve in your face becomes inflamed. Bell's palsy is not caused by a stroke. The cause of the nerve inflammation is not known. But some experts think that a virus may cause it. Because of this, doctors sometimes prescribe antiviral medicine to treat it. You also may get medicine to reduce swelling. Bell's palsy usually gets better on its own in a few weeks or months. Follow-up care is a key part of your treatment and safety. Be sure to make and go to all appointments, and call your doctor if you are having problems. It's also a good idea to know your test results and keep a list of the medicines you take. How can you care for yourself at home? · Take your medicines exactly as prescribed. Call your doctor if you think you are having a problem with your medicine. You will get more details on the specific medicines your doctor prescribes. · Use artificial tears or ointment if your eyes are too dry. Bell's palsy can make your lower eyelid droop, causing a dry eye. · If you cannot completely close your eye, consider using an eye patch while you sleep. · Help yourself blink by using your finger to close and open your eyelid. This may help keep your eye moist. 
· Wear glasses or goggles to keep dust and dirt out of your eye.  
· As feeling comes back to your face, massage your forehead, cheeks, and lips. Massage may make the muscles in your face stronger. · Brush and floss your teeth often to help prevent tooth decay. Bell's palsy can dry up the spit on one side of your mouth. This increases the risk of tooth decay. When should you call for help? Call 911 anytime you think you may need emergency care. For example, call if: 
? · You have symptoms of a stroke. These may include: 
¨ Sudden numbness, tingling, weakness, or loss of movement in your face, arm, or leg, especially on only one side of your body. ¨ Sudden vision changes. ¨ Sudden trouble speaking. ¨ Sudden confusion or trouble understanding simple statements. ¨ Sudden problems with walking or balance. ¨ A sudden, severe headache that is different from past headaches. ?Call your doctor now or seek immediate medical care if: 
? · You have numbness or weakness that spreads beyond one side of your face. ? · You have a skin rash or eye pain or redness, or light bothers your eyes. ? · You have a new or worse headache. ? Watch closely for changes in your health, and be sure to contact your doctor if: 
? · You do not get better as expected. Where can you learn more? Go to http://tramaine-sue.info/. Enter P168 in the search box to learn more about \"Bell's Palsy: Care Instructions. \" Current as of: October 14, 2016 Content Version: 11.4 © 1205-4995 LoSo. Care instructions adapted under license by [employer] (which disclaims liability or warranty for this information). If you have questions about a medical condition or this instruction, always ask your healthcare professional. [place] [address] any warranty or liability for your use of this information. Transient Ischemic Attack: Care Instructions Your Care Instructions A transient ischemic attack (TIA) is when blood flow to a part of your brain is blocked for a short time.  A TIA is like a stroke but usually lasts only a few minutes. A TIA does not cause lasting brain damage. Any vision problems, slurred speech, or other symptoms usually go away in 10 to 20 minutes. But they may last for up to 24 hours. TIAs are often warning signs of a stroke. Some people who have a TIA may have a stroke in the future. A stroke can cause symptoms like those of a TIA. But a stroke causes lasting damage to your brain. You can take steps to help prevent a stroke. One thing you can do is get early treatment. If you have other new symptoms, or if your symptoms do not get better, go back to the emergency room or call your doctor right away. Getting treatment right away may prevent long-term brain damage caused by a stroke. The doctor has checked you carefully, but problems can develop later. If you notice any problems or new symptoms, get medical treatment right away. Follow-up care is a key part of your treatment and safety. Be sure to make and go to all appointments, and call your doctor if you are having problems. It's also a good idea to know your test results and keep a list of the medicines you take. How can you care for yourself at home? Medicines ? · Be safe with medicines. Take your medicines exactly as prescribed. Call your doctor if you think you are having a problem with your medicine. ? · If you take a blood thinner, such as aspirin, be sure you get instructions about how to take your medicine safely. Blood thinners can cause serious bleeding problems. ? · Call your doctor if you are not able to take your medicines for any reason. ? · Do not take any over-the-counter medicines or herbal products without talking to your doctor first.  
? · If you take birth control pills or hormone therapy, talk to your doctor. Ask if these treatments are right for you. ? Lifestyle changes ? · Do not smoke. If you need help quitting, talk to your doctor about stop-smoking programs and medicines. ? · Be active. If your doctor recommends it, get more exercise. Walking is a good choice. Bit by bit, increase the amount you walk every day. Try for at least 30 minutes on most days of the week. You also may want to swim, bike, or do other activities. ? · Eat heart-healthy foods. These include fruits, vegetables, high-fiber foods, fish, and foods that are low in sodium, saturated fat, and trans fat. ? · Stay at a healthy weight. Lose weight if you need to.  
? · Limit alcohol to 2 drinks a day for men and 1 drink a day for women. ?Staying healthy ? · Manage other health problems such as diabetes, high blood pressure, and high cholesterol. ? · Get the flu vaccine every year. When should you call for help? Call 911 anytime you think you may need emergency care. For example, call if: 
? · You have new or worse symptoms of a stroke. These may include: 
¨ Sudden numbness, tingling, weakness, or loss of movement in your face, arm, or leg, especially on only one side of your body. ¨ Sudden vision changes. ¨ Sudden trouble speaking. ¨ Sudden confusion or trouble understanding simple statements. ¨ Sudden problems with walking or balance. ¨ A sudden, severe headache that is different from past headaches. Call 911 even if these symptoms go away in a few minutes. ? · You feel like you are having another TIA. ? Watch closely for changes in your health, and be sure to contact your doctor if you have any problems. Where can you learn more? Go to http://tramaine-sue.info/. Enter [phone] [id number] [id number] in the search box to learn more about \"Transient Ischemic Attack: Care Instructions. \" Current as of: March 20, 2017 Content Version: 11.4 © 3585-8078 [employer] Grind. Care instructions adapted under license by [employer] (which disclaims liability or warranty for this information).  If you have questions about a medical condition or this instruction, always ask your healthcare professional. [place] [address] any warranty or liability for your use of this information. DISCHARGE SUMMARY from Nurse PATIENT INSTRUCTIONS: 
 
 
F-face looks uneven A-arms unable to move or move unevenly S-speech slurred or non-existent T-time-call 911 as soon as signs and symptoms begin-DO NOT go Back to bed or wait to see if you get better-TIME IS BRAIN. Warning Signs of HEART ATTACK Call 911 if you have these symptoms: 
? Chest discomfort. Most heart attacks involve discomfort in the center of the chest that lasts more than a few minutes, or that goes away and comes back. It can feel like uncomfortable pressure, squeezing, fullness, or pain. ? Discomfort in other areas of the upper body. Symptoms can include pain or discomfort in one or both arms, the back, neck, jaw, or stomach. ? Shortness of breath with or without chest discomfort. ? Other signs may include breaking out in a cold sweat, nausea, or lightheadedness. Don't wait more than five minutes to call [address]! Fast action can save your life. Calling 911 is almost always the fastest way to get lifesaving treatment. Emergency Medical Services staff can begin treatment when they arrive  up to an hour sooner than if someone gets to the hospital by car. The discharge information has been reviewed with the patient. The patient verbalized understanding. Discharge medications reviewed with the patient and appropriate educational materials and side effects teaching were provided. ___________________________________________________________________________________________________________________________________ MyChart Announcement We are excited to announce that we are making your provider's discharge notes available to you in Omnireliant. You will see these notes when they are completed and signed by the physician that discharged you from your recent hospital stay. If you have any questions or concerns about any information you see in Omnireliant, please call the Health Information Department where you were seen or reach out to your Primary Care Provider for more information about your plan of care. Introducing [facility]! Chinedu Antony introduces Omnireliant patient portal. Now you can access parts of your medical record, email your doctor's office, and request medication refills online. 1. In your internet browser, go to [email or web address] 2. Click on the First Time User? Click Here link in the Sign In box. You will see the New Member Sign Up page. 3. Enter your Omnireliant Access Code exactly as it appears below. You will not need to use this code after youve completed the sign-up process. If you do not sign up before the expiration date, you must request a new code. · Omnireliant Access Code: [email or web address] Expires: 7/9/2018  3:25 PM 
 
4. Enter the last four digits of your Social Security Number (xxxx) and Date of Birth (mm/dd/yyyy) as indicated and click Submit. You will be taken to the next sign-up page. 5. Create a Omnireliant ID. This will be your Omnireliant login ID and cannot be changed, so think of one that is secure and easy to remember. 6. Create a Omnireliant password. You can change your password at any time. 7. Enter your Password Reset Question and Answer. This can be used at a later time if you forget your password. 8. Enter your e-mail address. You will receive e-mail notification when new information is available in [address] 9. Click Sign Up. You can now view and download portions of your medical record.  
10. Click the Download Summary menu link to download a portable copy of your medical information. If you have questions, please visit the Frequently Asked Questions section of the Ramamiahart website. Remember, Jigsee is NOT to be used for urgent needs. For medical emergencies, dial 911. Now available from your iPhone and Android! Introducing Luciano Oglesby As a [facility] patient, I wanted to make you aware of our electronic visit tool called Luciano Oglesby. VirtualScopicsrichardmb 24/7 allows you to connect within minutes with a medical provider 24 hours a day, seven days a week via a mobile device or tablet or logging into a secure website from your computer. You can access Luciano Oglesby from anywhere in the [place]. A virtual visit might be right for you when you have a simple condition and feel like you just dont want to get out of bed, or cant get away from work for an appointment, when your regular [facility] provider is not available (evenings, weekends or holidays), or when youre out of town and need minor care. Electronic visits cost only $49 and if the [facility] 24/7 provider determines a prescription is needed to treat your condition, one can be electronically transmitted to a nearby pharmacy*. Please take a moment to enroll today if you have not already done so. The enrollment process is free and takes just a few minutes. To enroll, please download the iCentera 24/7 leo to your tablet or phone, or visit [email or web address] to enroll on your computer. And, as an [address] [place] [address] patient with a [employer] account, the results of your visits will be scanned into your electronic medical record and your primary care provider will be able to view the scanned results. We urge you to continue to see your regular [facility] provider for your ongoing medical care.   And while your primary care provider may not be the one available when you seek a Luciano Oglesby virtual visit, the peace of mind you get from getting a real diagnosis real time can be priceless. For more information on Luciano Maloneerasmofin, view our Frequently Asked Questions (FAQs) at [email or web address]. Sincerely, 
 
Faith Ceja MD 
Chief Medical Officer [address] *:  certain medications cannot be prescribed via Luciano Oglesby Unresulted Labs-Please follow up with your PCP about these lab tests Order Current Status LYME AB TOTAL W/RFLX W BLOT In process Providers Seen During Your Hospitalization Provider Specialty Primary office phone Jose Vasquez MD Emergency Medicine [phone] Yari Gamboa MD Internal Medicine [phone] Your Primary Care Physician (PCP) Primary Care Physician Office Phone Office Fax UNKNOWN, PROVIDER ** None ** ** None ** You are allergic to the following No active allergies Recent Documentation Height Weight BMI Smoking Status 1.778 m 105.4 kg 33.33 kg/m2 Current Every Day Smoker Emergency Contacts Name Discharge Info Relation Home Work Mobile ADITHYA CALERO  Spouse [3] [phone] Patient Belongings The following personal items are in your possession at time of discharge: 
  Dental Appliances: None  Visual Aid: None      Home Medications: None   Jewelry: Earrings, Necklace (metal earring/ metal necklace/ tongue ring)  Clothing: Footwear, Pants, Shirt, Undergarments    Other Valuables: Cell Phone, Wallet  Personal Items Sent to Safe: none Please provide this summary of care documentation to your next provider. Signatures-by signing, you are acknowledging that this After Visit Summary has been reviewed with you and you have received a copy. Patient Signature:  ____________________________________________________________ Date:  ____________________________________________________________  
  
Davida Chanell  Provider Signature: ____________________________________________________________ Date:  ____________________________________________________________

## 2018-04-10 NOTE — H&P
Hospitalist H&P Note     Admit Date:  4/10/2018  3:40 PM   Name:  Grace Grimes. Age:  41 y.o.  :  1970   MRN:  [id number]   PCP:  Not On File [facility]  Treatment Team: Attending Provider: Miguel Palacios MD; Primary Nurse: Dolores Anderson, ALBERTO    HPI:    is a 40 yo male who presented with facial numbness on a background of GERD and Barretts. He reported that he experienced right facial numbness since 2 AM today . He denies the following: history of stroke, limb weakness, slurred speech. He is unable to close his right eye fully and also has a right sided droop in the lip. He denies any history of TIA or CVA. He is an every day smoker (2 packs/day). He denies any alcohol intake or drug use. He denies any difficulty swallowing, history of HTN or DM2. He works as a . 10 systems reviewed and negative except as noted in HPI. Past Medical History:   Diagnosis Date    CAD (coronary artery disease)     Gastrointestinal disorder     gerd    Other ill-defined conditions(799.89)     slipped disk.  Psychiatric disorder     anxiety    TIA (transient ischemic attack) 4/10/2018      Past Surgical History:   Procedure Laterality Date    HX ORTHOPAEDIC      bilateral knee, right hand      No Known Allergies   Social History   Substance Use Topics    Smoking status: Current Every Day Smoker     Packs/day: 1.50    Smokeless tobacco: Never Used    Alcohol use No      History reviewed. No pertinent family history. There is no immunization history on file for this patient. PTA Medications:  Prior to Admission Medications   Prescriptions Last Dose Informant Patient Reported? Taking? LEXAPRO 20 mg Tab   Yes No   Sig: take 20 mg by mouth daily. OTHER,NON-FORMULARY,   Yes No   Sig: Indications: oxaprozine 600mg bid   PRILOSEC 10 mg CpDR   Yes No   Sig: Take 20 mg by mouth two (2) times a day. PRILOSEC 20 mg CpDR   Yes No   Sig: take 20 mg by mouth daily. hydrocodone-acetaminophen (LORTAB 7.5) 7.5-500 mg per tablet   Yes No   Sig: Take 1 Tab by mouth every four (4) hours as needed. Indications: not helping   methylPREDNIsolone (MEDROL, STAR,) 4 mg tablet   No No   Sig: Take  by mouth. Per dose pack instructions      Facility-Administered Medications: None       Review of Systems:  Gen: No weight loss  Eyes: no vision changes  ENT: no sore throat  Resp: No dyspnea or cough  CV: No chest pain  Abd:  no abd pain, no vomiting or diarrhea  : No incontinence, no hematuria or dysuria, no flank pain  MSK: no leg swelling  Neuro: No HA or dizziness, no weakness, + numbness/tingling    Objective:   Patient Vitals for the past 24 hrs:   Temp Pulse Resp BP SpO2   04/10/18 1533 97.9 °F (36.6 °C) (!) 102 18 134/83 97 %     Oxygen Therapy  O2 Sat (%): 97 % (04/10/18 1533)  O2 Device: Room air (04/10/18 1533)  No intake or output data in the 24 hours ending 04/10/18 1803    Physical Exam:  General:    Well nourished. Alert. Eyes:   Normal sclera. Extraocular movements intact. , unable to fully close right eye, right lip droop  ENT:  Normocephalic, atraumatic. Moist mucous membranes  CV:   RRR. No murmur, rub, or gallop. Lungs:  CTAB. No wheezing, rhonchi, or rales. Abdomen: Soft, nontender, nondistended. Bowel sounds normal.   Extremities: Warm and dry. No cyanosis or edema. Neurologic: CN II-XII grossly intact. Decreased sensation in V2,V3 distribution of right side of face though sensation still present, 5/5 strength in bilateral UL and LL  Skin:     No rashes or jaundice. Psych:  Normal mood and affect. I reviewed the labs, imaging, EKGs, telemetry, and other studies done this admission.   Data Review:   Recent Results (from the past 24 hour(s))   CBC WITH AUTOMATED DIFF    Collection Time: 04/10/18  4:36 PM   Result Value Ref Range    WBC 9.8 4.3 - 11.1 K/uL    RBC 5.26 4.23 - 5.67 M/uL    HGB 14.4 13.6 - 17.2 g/dL    HCT 44.3 41.1 - 50.3 %    MCV 84.2 79.6 - 97.8 FL    MCH 27.4 26.1 - 32.9 PG    MCHC 32.5 31.4 - 35.0 g/dL    RDW 12.9 11.9 - 14.6 %    PLATELET 841 123 - 619 K/uL    MPV 10.4 (L) 10.8 - 14.1 FL    DF AUTOMATED      NEUTROPHILS 61 43 - 78 %    LYMPHOCYTES 30 13 - 44 %    MONOCYTES 6 4.0 - 12.0 %    EOSINOPHILS 2 0.5 - 7.8 %    BASOPHILS 0 0.0 - 2.0 %    IMMATURE GRANULOCYTES 1 0.0 - 5.0 %    ABS. NEUTROPHILS 6.0 1.7 - 8.2 K/UL    ABS. LYMPHOCYTES 2.9 0.5 - 4.6 K/UL    ABS. MONOCYTES 0.6 0.1 - 1.3 K/UL    ABS. EOSINOPHILS 0.2 0.0 - 0.8 K/UL    ABS. BASOPHILS 0.0 0.0 - 0.2 K/UL    ABS. IMM. GRANS. 0.1 0.0 - 0.5 K/UL   METABOLIC PANEL, COMPREHENSIVE    Collection Time: 04/10/18  4:36 PM   Result Value Ref Range    Sodium 141 136 - 145 mmol/L    Potassium 3.8 3.5 - 5.1 mmol/L    Chloride 105 98 - 107 mmol/L    CO2 26 21 - 32 mmol/L    Anion gap 10 7 - 16 mmol/L    Glucose 114 (H) 65 - 100 mg/dL    BUN 10 6 - 23 MG/DL    Creatinine 1.08 0.8 - 1.5 MG/DL    GFR est AA >60 >60 ml/min/1.73m2    GFR est non-AA >60 >60 ml/min/1.73m2    Calcium 8.5 8.3 - 10.4 MG/DL    Bilirubin, total 0.2 0.2 - 1.1 MG/DL    ALT (SGPT) 25 12 - 65 U/L    AST (SGOT) 19 15 - 37 U/L    Alk. phosphatase 114 50 - 136 U/L    Protein, total 7.2 6.3 - 8.2 g/dL    Albumin 3.4 (L) 3.5 - 5.0 g/dL    Globulin 3.8 (H) 2.3 - 3.5 g/dL    A-G Ratio 0.9 (L) 1.2 - 3.5     PROTHROMBIN TIME + INR    Collection Time: 04/10/18  4:36 PM   Result Value Ref Range    Prothrombin time 12.6 11.5 - 14.5 sec    INR 0.9         All Micro Results     None          Other Studies:  Xr Chest Pa Lat    Result Date: 4/10/2018  Two view chest:  04/10/2018 History: Acute right facial numbness. Comparison: None Findings: The heart and mediastinal silhouette are normal in size and configuration. The lungs and pleural spaces are clear. The pulmonary vascularity is within normal limits. The visualized osseous structures are unremarkable.      Impression: No active disease in the chest.     Ct Head Wo Cont    Result Date: 4/10/2018  CT SCAN OF THE BRAIN 4/10/2018 Indication:  Right facial numbness TECHNIQUE:  Axial scans from skull base to vertex. No contrast.  Radiation dose reduction techniques were used for this study: All CT scans performed at this facility use one or all of the following: Automated exposure control, adjustment of the mA and/or kVp according to patient's size, iterative reconstruction. COMPARISON:  There are no recent previous comparable exams on this PACs system. Findings: There is no evidence for mass, acute hemorrhage, or midline shift of the brain. Ventricles are normal size. No abnormal fluid collection is identified. No definite evidence for acute major arterial territory infarct. Skull appears intact. Mucosal thickening noted paranasal sinuses. IMPRESSION:  No acute CT findings in the brain. Assessment and Plan:     Hospital Problems as of 4/10/2018  Never Reviewed          Codes Class Noted - Resolved POA    TIA (transient ischemic attack) ICD-10-CM: G45.9  ICD-9-CM: 435.9  4/10/2018 - Present Unknown        GERD (gastroesophageal reflux disease) ICD-10-CM: K21.9  ICD-9-CM: 530.81  4/10/2018 - Present Unknown        Munoz's esophagus with dysplasia ICD-10-CM: K22.719  ICD-9-CM: 530.85  4/10/2018 - Present Unknown              PLAN:  TIA   CT head: no acute findings  Plan  Check MRI brain, 2d echo, CTA head and neck, lipids and a1c. Antithrombotic and statin ordered. PT/OT/SLP evals.     Permissive HTN and no BP meds unless SBP >220 or DBP >120 for the first 24 hours after symptom onset, unless other evidence of organ damage from HTN  Barretts: Continue Protonix        DVT ppx:  heparin  Anticipated DC needs:  <48 hours, needs telemetry monitoring (reason for ICU placement)  Code status:  Full  Risk:  high    Signed:  Elena Helms MD

## 2018-04-11 VITALS
BODY MASS INDEX: 33.26 KG/M2 | OXYGEN SATURATION: 98 % | SYSTOLIC BLOOD PRESSURE: 141 MMHG | HEIGHT: 70 IN | RESPIRATION RATE: 22 BRPM | TEMPERATURE: 97.8 F | HEART RATE: 81 BPM | DIASTOLIC BLOOD PRESSURE: 62 MMHG | WEIGHT: 232.3 LBS

## 2018-04-11 PROBLEM — G51.0 BELL'S PALSY: Status: ACTIVE | Noted: 2018-04-11

## 2018-04-11 LAB
ALBUMIN SERPL-MCNC: 3.1 G/DL (ref 3.5–5)
ALBUMIN/GLOB SERPL: 0.8 {RATIO} (ref 1.2–3.5)
ALP SERPL-CCNC: 110 U/L (ref 50–136)
ALT SERPL-CCNC: 25 U/L (ref 12–65)
ANION GAP SERPL CALC-SCNC: 8 MMOL/L (ref 7–16)
AST SERPL-CCNC: 18 U/L (ref 15–37)
BASOPHILS # BLD: 0 K/UL (ref 0–0.2)
BASOPHILS NFR BLD: 0 % (ref 0–2)
BILIRUB SERPL-MCNC: 0.2 MG/DL (ref 0.2–1.1)
BUN SERPL-MCNC: 10 MG/DL (ref 6–23)
CALCIUM SERPL-MCNC: 8.6 MG/DL (ref 8.3–10.4)
CHLORIDE SERPL-SCNC: 105 MMOL/L (ref 98–107)
CHOLEST SERPL-MCNC: 184 MG/DL
CO2 SERPL-SCNC: 29 MMOL/L (ref 21–32)
CREAT SERPL-MCNC: 1.02 MG/DL (ref 0.8–1.5)
DIFFERENTIAL METHOD BLD: ABNORMAL
EOSINOPHIL # BLD: 0.2 K/UL (ref 0–0.8)
EOSINOPHIL NFR BLD: 2 % (ref 0.5–7.8)
ERYTHROCYTE [DISTWIDTH] IN BLOOD BY AUTOMATED COUNT: 12.9 % (ref 11.9–14.6)
EST. AVERAGE GLUCOSE BLD GHB EST-MCNC: 108 MG/DL
GLOBULIN SER CALC-MCNC: 3.7 G/DL (ref 2.3–3.5)
GLUCOSE SERPL-MCNC: 104 MG/DL (ref 65–100)
HBA1C MFR BLD: 5.4 % (ref 4.8–6)
HCT VFR BLD AUTO: 43.4 % (ref 41.1–50.3)
HDLC SERPL-MCNC: 31 MG/DL (ref 40–60)
HDLC SERPL: 5.9 {RATIO}
HGB BLD-MCNC: 14 G/DL (ref 13.6–17.2)
IMM GRANULOCYTES # BLD: 0 K/UL (ref 0–0.5)
IMM GRANULOCYTES NFR BLD AUTO: 0 % (ref 0–5)
LDLC SERPL CALC-MCNC: 119 MG/DL
LIPID PROFILE,FLP: ABNORMAL
LYMPHOCYTES # BLD: 3.4 K/UL (ref 0.5–4.6)
LYMPHOCYTES NFR BLD: 39 % (ref 13–44)
MCH RBC QN AUTO: 27.3 PG (ref 26.1–32.9)
MCHC RBC AUTO-ENTMCNC: 32.3 G/DL (ref 31.4–35)
MCV RBC AUTO: 84.8 FL (ref 79.6–97.8)
MONOCYTES # BLD: 0.6 K/UL (ref 0.1–1.3)
MONOCYTES NFR BLD: 7 % (ref 4–12)
NEUTS SEG # BLD: 4.3 K/UL (ref 1.7–8.2)
NEUTS SEG NFR BLD: 52 % (ref 43–78)
PLATELET # BLD AUTO: 260 K/UL (ref 150–450)
PMV BLD AUTO: 10.4 FL (ref 10.8–14.1)
POTASSIUM SERPL-SCNC: 4.3 MMOL/L (ref 3.5–5.1)
PROT SERPL-MCNC: 6.8 G/DL (ref 6.3–8.2)
RBC # BLD AUTO: 5.12 M/UL (ref 4.23–5.67)
SODIUM SERPL-SCNC: 142 MMOL/L (ref 136–145)
TRIGL SERPL-MCNC: 170 MG/DL (ref 35–150)
TSH SERPL DL<=0.005 MIU/L-ACNC: 0.01 UIU/ML (ref 0.36–3.74)
VLDLC SERPL CALC-MCNC: 34 MG/DL (ref 6–23)
WBC # BLD AUTO: 8.5 K/UL (ref 4.3–11.1)

## 2018-04-11 PROCEDURE — 99218 HC RM OBSERVATION: CPT

## 2018-04-11 PROCEDURE — 74011250637 HC RX REV CODE- 250/637: Performed by: INTERNAL MEDICINE

## 2018-04-11 PROCEDURE — C8929 TTE W OR WO FOL WCON,DOPPLER: HCPCS

## 2018-04-11 PROCEDURE — 74011000250 HC RX REV CODE- 250: Performed by: INTERNAL MEDICINE

## 2018-04-11 PROCEDURE — 92610 EVALUATE SWALLOWING FUNCTION: CPT

## 2018-04-11 PROCEDURE — 80061 LIPID PANEL: CPT | Performed by: INTERNAL MEDICINE

## 2018-04-11 PROCEDURE — 85025 COMPLETE CBC W/AUTO DIFF WBC: CPT | Performed by: INTERNAL MEDICINE

## 2018-04-11 PROCEDURE — 74011636637 HC RX REV CODE- 636/637: Performed by: PSYCHIATRY & NEUROLOGY

## 2018-04-11 PROCEDURE — 80053 COMPREHEN METABOLIC PANEL: CPT | Performed by: INTERNAL MEDICINE

## 2018-04-11 PROCEDURE — 36415 COLL VENOUS BLD VENIPUNCTURE: CPT | Performed by: INTERNAL MEDICINE

## 2018-04-11 PROCEDURE — 97165 OT EVAL LOW COMPLEX 30 MIN: CPT

## 2018-04-11 PROCEDURE — 97161 PT EVAL LOW COMPLEX 20 MIN: CPT

## 2018-04-11 PROCEDURE — 74011250637 HC RX REV CODE- 250/637: Performed by: PSYCHIATRY & NEUROLOGY

## 2018-04-11 PROCEDURE — 83036 HEMOGLOBIN GLYCOSYLATED A1C: CPT | Performed by: INTERNAL MEDICINE

## 2018-04-11 PROCEDURE — 74011250636 HC RX REV CODE- 250/636: Performed by: INTERNAL MEDICINE

## 2018-04-11 PROCEDURE — 84443 ASSAY THYROID STIM HORMONE: CPT | Performed by: INTERNAL MEDICINE

## 2018-04-11 RX ORDER — ACYCLOVIR 200 MG/1
200 CAPSULE ORAL
Qty: 50 CAP | Refills: 0 | Status: SHIPPED | OUTPATIENT
Start: 2018-04-11 | End: 2018-04-21

## 2018-04-11 RX ORDER — PREDNISONE 10 MG/1
10 TABLET ORAL
Status: DISCONTINUED | OUTPATIENT
Start: 2018-04-12 | End: 2018-04-11 | Stop reason: HOSPADM

## 2018-04-11 RX ORDER — PREDNISONE 10 MG/1
10 TABLET ORAL
Status: DISCONTINUED | OUTPATIENT
Start: 2018-04-11 | End: 2018-04-11 | Stop reason: HOSPADM

## 2018-04-11 RX ORDER — PREDNISONE 20 MG/1
20 TABLET ORAL
Status: DISCONTINUED | OUTPATIENT
Start: 2018-04-12 | End: 2018-04-11 | Stop reason: HOSPADM

## 2018-04-11 RX ORDER — ATORVASTATIN CALCIUM 40 MG/1
40 TABLET, FILM COATED ORAL DAILY
Qty: 30 TAB | Refills: 0 | Status: SHIPPED | OUTPATIENT
Start: 2018-04-12

## 2018-04-11 RX ORDER — PREDNISONE 10 MG/1
10 TABLET ORAL
Status: DISCONTINUED | OUTPATIENT
Start: 2018-04-14 | End: 2018-04-11 | Stop reason: HOSPADM

## 2018-04-11 RX ORDER — PREDNISONE 10 MG/1
10 TABLET ORAL
Status: DISCONTINUED | OUTPATIENT
Start: 2018-04-16 | End: 2018-04-11 | Stop reason: HOSPADM

## 2018-04-11 RX ORDER — PREDNISONE 20 MG/1
20 TABLET ORAL
Status: COMPLETED | OUTPATIENT
Start: 2018-04-11 | End: 2018-04-11

## 2018-04-11 RX ORDER — PREDNISONE 10 MG/1
10 TABLET ORAL
Status: DISCONTINUED | OUTPATIENT
Start: 2018-04-13 | End: 2018-04-11 | Stop reason: HOSPADM

## 2018-04-11 RX ORDER — RANITIDINE 150 MG/1
150 TABLET, FILM COATED ORAL 2 TIMES DAILY
COMMUNITY

## 2018-04-11 RX ORDER — POLYVINYL ALCOHOL 14 MG/ML
1 SOLUTION/ DROPS OPHTHALMIC AS NEEDED
Status: DISCONTINUED | OUTPATIENT
Start: 2018-04-11 | End: 2018-04-11 | Stop reason: HOSPADM

## 2018-04-11 RX ORDER — GUAIFENESIN 100 MG/5ML
81 LIQUID (ML) ORAL DAILY
Qty: 30 TAB | Refills: 0 | Status: SHIPPED | OUTPATIENT
Start: 2018-04-11

## 2018-04-11 RX ORDER — POLYVINYL ALCOHOL 14 MG/ML
1 SOLUTION/ DROPS OPHTHALMIC AS NEEDED
Qty: 50 ML | Refills: 0 | Status: SHIPPED | OUTPATIENT
Start: 2018-04-11 | End: 2018-05-11

## 2018-04-11 RX ORDER — TIZANIDINE HYDROCHLORIDE 4 MG/1
8 CAPSULE, GELATIN COATED ORAL
COMMUNITY

## 2018-04-11 RX ORDER — PREDNISONE 10 MG/1
10 TABLET ORAL
Status: DISCONTINUED | OUTPATIENT
Start: 2018-04-15 | End: 2018-04-11 | Stop reason: HOSPADM

## 2018-04-11 RX ORDER — PREDNISONE 20 MG/1
20 TABLET ORAL
Status: DISCONTINUED | OUTPATIENT
Start: 2018-04-11 | End: 2018-04-11 | Stop reason: HOSPADM

## 2018-04-11 RX ORDER — PREDNISONE 20 MG/1
TABLET ORAL
Qty: 24 TAB | Refills: 0 | Status: SHIPPED | OUTPATIENT
Start: 2018-04-11

## 2018-04-11 RX ORDER — ACYCLOVIR 200 MG/1
200 CAPSULE ORAL
Status: DISCONTINUED | OUTPATIENT
Start: 2018-04-11 | End: 2018-04-11 | Stop reason: HOSPADM

## 2018-04-11 RX ADMIN — POLYVINYL ALCOHOL 1 DROP: 14 SOLUTION/ DROPS OPHTHALMIC at 10:49

## 2018-04-11 RX ADMIN — WHITE PETROLATUM MINERAL OIL: 150; 830 OINTMENT OPHTHALMIC at 10:49

## 2018-04-11 RX ADMIN — ACYCLOVIR 200 MG: 200 CAPSULE ORAL at 10:20

## 2018-04-11 RX ADMIN — Medication 10 ML: at 06:00

## 2018-04-11 RX ADMIN — ATORVASTATIN CALCIUM 40 MG: 40 TABLET, FILM COATED ORAL at 08:58

## 2018-04-11 RX ADMIN — PANTOPRAZOLE SODIUM 40 MG: 40 TABLET, DELAYED RELEASE ORAL at 08:58

## 2018-04-11 RX ADMIN — PERFLUTREN 1 ML: 6.52 INJECTION, SUSPENSION INTRAVENOUS at 10:00

## 2018-04-11 RX ADMIN — PREDNISONE 20 MG: 20 TABLET ORAL at 10:20

## 2018-04-11 NOTE — DISCHARGE INSTRUCTIONS
Bell's Palsy: Care Instructions  Your Care Instructions    Bell's palsy is paralysis or weakness of the muscles on one side of the face. Often people with Bell's palsy have a droop on one side of the mouth and have trouble completely shutting the eye on the same side. Bell's palsy can also interfere with the sense of taste. These things happen when a nerve in your face becomes inflamed. Bell's palsy is not caused by a stroke. The cause of the nerve inflammation is not known. But some experts think that a virus may cause it. Because of this, doctors sometimes prescribe antiviral medicine to treat it. You also may get medicine to reduce swelling. Bell's palsy usually gets better on its own in a few weeks or months. Follow-up care is a key part of your treatment and safety. Be sure to make and go to all appointments, and call your doctor if you are having problems. It's also a good idea to know your test results and keep a list of the medicines you take. How can you care for yourself at home? · Take your medicines exactly as prescribed. Call your doctor if you think you are having a problem with your medicine. You will get more details on the specific medicines your doctor prescribes. · Use artificial tears or ointment if your eyes are too dry. Bell's palsy can make your lower eyelid droop, causing a dry eye. · If you cannot completely close your eye, consider using an eye patch while you sleep. · Help yourself blink by using your finger to close and open your eyelid. This may help keep your eye moist.  · Wear glasses or goggles to keep dust and dirt out of your eye. · As feeling comes back to your face, massage your forehead, cheeks, and lips. Massage may make the muscles in your face stronger. · Brush and floss your teeth often to help prevent tooth decay. Bell's palsy can dry up the spit on one side of your mouth. This increases the risk of tooth decay. When should you call for help?   Call 911 anytime you think you may need emergency care. For example, call if:  ? · You have symptoms of a stroke. These may include:  ¨ Sudden numbness, tingling, weakness, or loss of movement in your face, arm, or leg, especially on only one side of your body. ¨ Sudden vision changes. ¨ Sudden trouble speaking. ¨ Sudden confusion or trouble understanding simple statements. ¨ Sudden problems with walking or balance. ¨ A sudden, severe headache that is different from past headaches. ?Call your doctor now or seek immediate medical care if:  ? · You have numbness or weakness that spreads beyond one side of your face. ? · You have a skin rash or eye pain or redness, or light bothers your eyes. ? · You have a new or worse headache. ? Watch closely for changes in your health, and be sure to contact your doctor if:  ? · You do not get better as expected. Where can you learn more? Go to http://tramaine-sue.info/. Enter P168 in the search box to learn more about \"Bell's Palsy: Care Instructions. \"  Current as of: October 14, 2016  Content Version: 11.4  © 9698-4183 [employer]. Care instructions adapted under license by [employer] (which disclaims liability or warranty for this information). If you have questions about a medical condition or this instruction, always ask your healthcare professional. [address] any warranty or liability for your use of this information. Transient Ischemic Attack: Care Instructions  Your Care Instructions    A transient ischemic attack (TIA) is when blood flow to a part of your brain is blocked for a short time. A TIA is like a stroke but usually lasts only a few minutes. A TIA does not cause lasting brain damage. Any vision problems, slurred speech, or other symptoms usually go away in 10 to 20 minutes. But they may last for up to 24 hours. TIAs are often warning signs of a stroke.  Some people who have a TIA may have a stroke in the future. A stroke can cause symptoms like those of a TIA. But a stroke causes lasting damage to your brain. You can take steps to help prevent a stroke. One thing you can do is get early treatment. If you have other new symptoms, or if your symptoms do not get better, go back to the emergency room or call your doctor right away. Getting treatment right away may prevent long-term brain damage caused by a stroke. The doctor has checked you carefully, but problems can develop later. If you notice any problems or new symptoms, get medical treatment right away. Follow-up care is a key part of your treatment and safety. Be sure to make and go to all appointments, and call your doctor if you are having problems. It's also a good idea to know your test results and keep a list of the medicines you take. How can you care for yourself at home? Medicines  ? · Be safe with medicines. Take your medicines exactly as prescribed. Call your doctor if you think you are having a problem with your medicine. ? · If you take a blood thinner, such as aspirin, be sure you get instructions about how to take your medicine safely. Blood thinners can cause serious bleeding problems. ? · Call your doctor if you are not able to take your medicines for any reason. ? · Do not take any over-the-counter medicines or herbal products without talking to your doctor first.   ? · If you take birth control pills or hormone therapy, talk to your doctor. Ask if these treatments are right for you. ? Lifestyle changes  ? · Do not smoke. If you need help quitting, talk to your doctor about stop-smoking programs and medicines. ? · Be active. If your doctor recommends it, get more exercise. Walking is a good choice. Bit by bit, increase the amount you walk every day. Try for at least 30 minutes on most days of the week. You also may want to swim, bike, or do other activities. ? · Eat heart-healthy foods.  These include fruits, vegetables, high-fiber foods, fish, and foods that are low in sodium, saturated fat, and trans fat. ? · Stay at a healthy weight. Lose weight if you need to.   ? · Limit alcohol to 2 drinks a day for men and 1 drink a day for women. ?Staying healthy  ? · Manage other health problems such as diabetes, high blood pressure, and high cholesterol. ? · Get the flu vaccine every year. When should you call for help? Call 911 anytime you think you may need emergency care. For example, call if:  ? · You have new or worse symptoms of a stroke. These may include:  ¨ Sudden numbness, tingling, weakness, or loss of movement in your face, arm, or leg, especially on only one side of your body. ¨ Sudden vision changes. ¨ Sudden trouble speaking. ¨ Sudden confusion or trouble understanding simple statements. ¨ Sudden problems with walking or balance. ¨ A sudden, severe headache that is different from past headaches. Call 911 even if these symptoms go away in a few minutes. ? · You feel like you are having another TIA. ? Watch closely for changes in your health, and be sure to contact your doctor if you have any problems. Where can you learn more? Go to http://tramaine-sue.info/. Enter [phone] in the search box to learn more about \"Transient Ischemic Attack: Care Instructions. \"  Current as of: March 20, 2017  Content Version: 11.4  © 9701-1721 [employer]. Care instructions adapted under license by [employer] (which disclaims liability or warranty for this information). If you have questions about a medical condition or this instruction, always ask your healthcare professional. [place] [address] any warranty or liability for your use of this information.     DISCHARGE SUMMARY from Nurse    PATIENT INSTRUCTIONS:    After general anesthesia or intravenous sedation, for 24 hours or while taking prescription Narcotics:  · Limit your activities  · Do not drive and operate hazardous machinery  · Do not make important personal or business decisions  · Do  not drink alcoholic beverages  · If you have not urinated within 8 hours after discharge, please contact your surgeon on call. Report the following to your surgeon:  · Excessive pain, swelling, redness or odor of or around the surgical area  · Temperature over 100.5  · Nausea and vomiting lasting longer than 4 hours or if unable to take medications  · Any signs of decreased circulation or nerve impairment to extremity: change in color, persistent  numbness, tingling, coldness or increase pain  · Any questions    What to do at Home:  Recommended activity: Activity as tolerated    *  Please give a list of your current medications to your Primary Care Provider. *  Please update this list whenever your medications are discontinued, doses are      changed, or new medications (including over-the-counter products) are added. *  Please carry medication information at all times in case of emergency situations. These are general instructions for a healthy lifestyle:    No smoking/ No tobacco products/ Avoid exposure to second hand smoke  Surgeon General's Warning:  Quitting smoking now greatly reduces serious risk to your health. Obesity, smoking, and sedentary lifestyle greatly increases your risk for illness    A healthy diet, regular physical exercise & weight monitoring are important for maintaining a healthy lifestyle    You may be retaining fluid if you have a history of heart failure or if you experience any of the following symptoms:  Weight gain of 3 pounds or more overnight or 5 pounds in a week, increased swelling in our hands or feet or shortness of breath while lying flat in bed. Please call your doctor as soon as you notice any of these symptoms; do not wait until your next office visit.     Recognize signs and symptoms of STROKE:    F-face looks uneven    A-arms unable to move or move unevenly    S-speech slurred or non-existent    T-time-call 911 as soon as signs and symptoms begin-DO NOT go       Back to bed or wait to see if you get better-TIME IS BRAIN. Warning Signs of HEART ATTACK     Call 911 if you have these symptoms:   Chest discomfort. Most heart attacks involve discomfort in the center of the chest that lasts more than a few minutes, or that goes away and comes back. It can feel like uncomfortable pressure, squeezing, fullness, or pain.  Discomfort in other areas of the upper body. Symptoms can include pain or discomfort in one or both arms, the back, neck, jaw, or stomach.  Shortness of breath with or without chest discomfort.  Other signs may include breaking out in a cold sweat, nausea, or lightheadedness. Don't wait more than five minutes to call 911 - MINUTES MATTER! Fast action can save your life. Calling 911 is almost always the fastest way to get lifesaving treatment. Emergency Medical Services staff can begin treatment when they arrive -- up to an hour sooner than if someone gets to the hospital by car. The discharge information has been reviewed with the patient. The patient verbalized understanding. Discharge medications reviewed with the patient and appropriate educational materials and side effects teaching were provided.   ___________________________________________________________________________________________________________________________________

## 2018-04-11 NOTE — PROGRESS NOTES
Care Management Interventions  PCP Verified by CM: Yes  Transition of Care Consult (CM Consult): Other  Current Support Network: Own Home  Confirm Follow Up Transport: Family  Plan discussed with Pt/Family/Caregiver: Yes  Freedom of Choice Offered: Yes  Discharge Location  Discharge Placement: Home    Saw pt in interdisciplinarily rounds, plan of care and discharge date/ location discussed. Per the hospitalitis the plan for d/c is 24 hrs. If ok with neuro, pt may be able to be d/c today. Confirmed pt PCP is Dr. Gabby Ferraro in [facility], pt states has seen her in the last 4 months and has a appt. next week for a check up.

## 2018-04-11 NOTE — PROGRESS NOTES
Speech language pathology: bedside swallow note: Initial Assessment and Discharge   OBSERVATION PATIENT    NAME/AGE/GENDER: Matty Murphy Sr. is a 41 y.o. male  DATE: 4/11/2018  PRIMARY DIAGNOSIS: TIA (transient ischemic attack)       ICD-10: Treatment Diagnosis: Oropharyngeal dysphagia R13.12  INTERDISCIPLINARY COLLABORATION: Registered Nurse  PRECAUTIONS/ALLERGIES: Review of patient's allergies indicates no known allergies. ASSESSMENT:Based on the objective data described below, Mr. Vivi Mejia presents with oral and pharyngeal phase of swallow within functional limits. Patient reports he has learned where to place cup, straw and utensil in order to keep food/liquid in his oral cavity. No overt signs or symptoms of aspiration with any consistency. Patient independently compensating. Conversational speech clear and appropriate. Recommend continue regular texture diet and thin liquids. No further skilled speech/swallow intervention currently indicated. PLAN OF CARE:   Patient will benefit from skilled intervention to address the following impairments. RECOMMENDATIONS AND PLANNED INTERVENTIONS (Benefits and precautions of therapy have been discussed with the patient.):  · continue prescribed diet  MEDICATIONS:  · With liquid  COMPENSATORY STRATEGIES/MODIFICATIONS INCLUDING:  · Small sips and bites  · Upright for all PO  RECOMMENDED REHABILITATION/EQUIPMENT: (at time of discharge pending progress): Due to the probability of continued deficits (see above) this patient will not likely need continued skilled speech therapy after discharge. SUBJECTIVE:   \"When I first swallowed before I came to the ER yesterday, more of it ended up on my chest than in my stomach. I've figured it out, now. \"  History of Present Injury/Illness: Mr. Vivi Mejia  has a past medical history of CAD (coronary artery disease); Gastrointestinal disorder; Other ill-defined conditions(799.89);  Psychiatric disorder; and TIA (transient ischemic attack) (4/10/2018). He also has no past medical history of Arthritis; Asthma; Autoimmune disease ([facility] Cancer ([facility] [facility] Chronic kidney disease; COPD; DEMENTIA; Diabetes ([facility] [address] [facility] Endocrine disease; Heart failure ([facility] [address] [facility] Hypertension; Infectious disease; Liver disease; PUD (peptic ulcer disease); Seizures ([facility] Sleep disorder; or Thromboembolus ([facility] He also  has a past surgical history that includes hx orthopaedic. Present Symptoms: Bell's Palsy   Pain Intensity 1: 0  Pain Location 1: Ear  Pain Orientation 1: Right  Current Medications:   No current facility-administered medications on file prior to encounter. Current Outpatient Prescriptions on File Prior to Encounter   Medication Sig Dispense Refill    OTHER,NON-FORMULARY, Indications: oxaprozine 600mg bid      hydrocodone-acetaminophen (LORTAB 7.5) 7.5-500 mg per tablet Take 1 Tab by mouth every four (4) hours as needed. Indications: not helping      methylPREDNIsolone (MEDROL, STAR,) 4 mg tablet Take  by mouth. Per dose pack instructions 1 Package 0    LEXAPRO 20 mg Tab take 20 mg by mouth daily.  PRILOSEC 10 mg CpDR Take 20 mg by mouth two (2) times a day.  PRILOSEC 20 mg CpDR take 20 mg by mouth daily. Current Dietary Status:  Regular diet and thin liquids    Social History/Home Situation:    Home Environment: Trailer/mobile home  # Steps to Enter: 4  Hand Rails : Right  One/Two Story Residence: One story  Living Alone: No  Support Systems: Parent  Patient Expects to be Discharged to[de-identified] Trailer/mobile home  Current DME Used/Available at Home: None  Tub or Shower Type: Tub/Shower combination  OBJECTIVE:   Respiratory Status:  Room air       Cognitive and Communication Status:  Neurologic State: Alert  Orientation Level: Appropriate for age;Oriented X4  Cognition: Appropriate decision making; Follows commands  Perception: Appears intact  Perseveration: No perseveration noted  Safety/Judgement: Insight into deficits    BEDSIDE SWALLOW EVALUATION  Oral Assessment:  Oral Assessment  Labial: Right droop  Dentition: Natural  Oral Hygiene: adequate  Lingual: No impairment  P.O. Trials:  Patient Position: upright in bed    The patient was given the following:   Consistency Presented: Thin liquid; Solid;Puree;Mixed consistency  How Presented: Self-fed/presented;Cup/sip;Cup/gulp; Spoon;Straw    ORAL PHASE:  Bolus Acceptance: No impairment  Bolus Formation/Control: No impairment  Propulsion: No impairment     Oral Residue: None    PHARYNGEAL PHASE:  Initiation of Swallow: No impairment  Laryngeal Elevation: Functional  Aspiration Signs/Symptoms: None  Vocal Quality: No impairment                OTHER OBSERVATIONS:  Rate/bite size: WNL   Endurance: WNL      Tool Used: Dysphagia Outcome and Severity Scale (EMMETT)    Score Comments   Normal Diet  [] 7 With no strategies or extra time needed   Functional Swallow  [x] 6 May have mild oral or pharyngeal delay       Mild Dysphagia    [] 5 Which may require one diet consistency restricted (those who demonstrate penetration which is entirely cleared on MBS would be included)   Mild-Moderate Dysphagia  [] 4 With 1-2 diet consistencies restricted       Moderate Dysphagia  [] 3 With 2 or more diet consistencies restricted       Moderately Severe Dysphagia  [] 2 With partial PO strategies (trials with ST only)       Severe Dysphagia  [] 1 With inability to tolerate any PO safely          Score:  Initial: 6 Most Recent: X (Date: -- )   Interpretation of Tool: The Dysphagia Outcome and Severity Scale (EMMETT) is a simple, easy-to-use, 7-point scale developed to systematically rate the functional severity of dysphagia based on objective assessment and make recommendations for diet level, independence level, and type of nutrition. Score 7 6 5 4 3 2 1   Modifier CH CI CJ CK CL CM CN   ?  Swallowing:     - CURRENT STATUS: CI - 1%-19% impaired, limited or restricted    - GOAL STATUS:  CI - 1%-19% impaired, limited or restricted    - D/C STATUS:  CI - 1%-19% impaired, limited or restricted  Payor: BLUE CROSS / Plan: [address] / Product Type: PPO /     TREATMENT:    (In addition to Assessment/Re-Assessment sessions the following treatments were rendered)  Assessment/Reassessment only, no treatment provided today  ______________________________________________________________________________________________  Safety:   After treatment position/precautions:  · RN informed  Treatment Assessment:  No further skilled speech/swallow intervention currently indicated.     Total Treatment Duration:  Time In: 1130  Time Out: Roger [address], [place], CCC-SLP

## 2018-04-11 NOTE — PROGRESS NOTES
Bedside and Verbal shift change report given to Ulices Nunez RN (oncoming nurse) by Singh Brown RN (offgoing nurse). Report included the following information SBAR, Kardex, ED Summary, Intake/Output, MAR, Recent Results and Cardiac Rhythm NSR. Dual skin and neuro checks complete. Pt A&Ox4. Follows commands appropriately. Pupils 3mm, round and brisk. Right facial droop present, +numbness. Unable to fully close right eye-- will order lubricant eye drops/ointment. Tongue midline. No visual disturbances. Does c/o some pain behind right ear intermittently. Strength equal in BUE/BLE. Denies dizziness, ha, n/v or any other complaints. VSS. Call light in reach.

## 2018-04-11 NOTE — PROGRESS NOTES
Problem: Self Care Deficits Care Plan (Adult)  Goal: *Acute Goals and Plan of Care (Insert Text)    OCCUPATIONAL THERAPY: Initial Assessment and Discharge 4/11/2018  OBSERVATION: Hospital Day: 2  Payor: BLUE CROSS / Plan: [place] [employer] [place] / Product Type: PPO /      NAME/AGE/GENDER: Jo Samuel Sr. is a 41 y.o. male   PRIMARY DIAGNOSIS:  TIA (transient ischemic attack) <principal problem not specified> <principal problem not specified>        ICD-10: Treatment Diagnosis:    · Other lack of cordination (R27.8)   Precautions/Allergies:     Review of patient's allergies indicates no known allergies. ASSESSMENT:     Mr. Irma Martins admitted with above diagnosis; pt presents independent with self care and functional mobility. No skilled OT indicated at this time. Will discharge OT. This section established at most recent assessment   PROBLEM LIST (Impairments causing functional limitations)   INTERVENTIONS PLANNED: (Benefits and precautions of occupational therapy have been discussed with the patient.)     TREATMENT PLAN: Frequency/Duration: discharge OT  Rehabilitation Potential For Stated Goals: discharge OT     RECOMMENDED REHABILITATION/EQUIPMENT: (at time of discharge pending progress): Due to the probability of continued deficits (see above) this patient will not likely need continued skilled occupational therapy after discharge. Equipment:    None at this time              OCCUPATIONAL PROFILE AND HISTORY:   History of Present Injury/Illness (Reason for Referral):  40 yo male who presented with facial numbness on a background of GERD and Barretts. He reported that he experienced right facial numbness since 2 AM today . He denies the following: history of stroke, limb weakness, slurred speech. He is unable to close his right eye fully and also has a right sided droop in the lip. He denies any history of TIA or CVA. He is an every day smoker (2 packs/day).   He denies any alcohol intake or drug use. He denies any difficulty swallowing, history of HTN or DM2. He works as a .     Past Medical History/Comorbidities:   Mr. Ricardo Felton  has a past medical history of CAD (coronary artery disease); Gastrointestinal disorder; Other ill-defined conditions(799.89); Psychiatric disorder; and TIA (transient ischemic attack) (4/10/2018). He also has no past medical history of Arthritis; Asthma; Autoimmune disease ([address] Cancer ([address] Chronic kidney disease; COPD; DEMENTIA; Diabetes ([address] Endocrine disease; Heart failure ([address] Hypertension; Infectious disease; Liver disease; PUD (peptic ulcer disease); Seizures ([address] Sleep disorder; or Thromboembolus ([facility] Mr. Ricardo Felton  has a past surgical history that includes hx orthopaedic.   Social History/Living Environment:   Home Environment: Trailer/mobile home  # Steps to Enter: 4  One/Two Story Residence: One story  Living Alone: No  Support Systems: Family member(s), Friends \ neighbors  Patient Expects to be Discharged to[de-identified] Trailer/mobile home  Current DME Used/Available at Home: None  Prior Level of Function/Work/Activity:  independent   Number of Personal Factors/Comorbidities that affect the Plan of Care: Brief history (0):  LOW COMPLEXITY   ASSESSMENT OF OCCUPATIONAL PERFORMANCE[de-identified]   Activities of Daily Living:          Basic ADLs (From Assessment) Complex ADLs (From Assessment)   Basic ADL  Feeding: Independent  Oral Facial Hygiene/Grooming: Independent  Bathing: Independent  Type of Bath: Chlorhexidine (CHG), Full  Upper Body Dressing: Independent  Lower Body Dressing: Independent  Toileting: Independent     Grooming/Bathing/Dressing Activities of Daily Living     Cognitive Retraining  Safety/Judgement: Awareness of environment                 Functional Transfers  Toilet Transfer : Independent  Tub Transfer: Independent  Shower Transfer: Independent     Bed/Mat Mobility  Rolling: Independent  Supine to Sit: Independent  Sit to Supine: Independent  Sit to Stand: Independent  Bed to Chair: Independent  Scooting: Independent       Most Recent Physical Functioning:   Gross Assessment:  AROM: Within functional limits (BUE)  Strength: Within functional limits (BUE)  Coordination: Within functional limits (BUE)  Tone: Normal  Sensation: Intact               Posture:     Balance:  Sitting: Intact  Standing: Intact Bed Mobility:  Rolling: Independent  Supine to Sit: Independent  Sit to Supine: Independent  Scooting: Independent  Wheelchair Mobility:     Transfers:  Sit to Stand: Independent  Stand to Sit: Independent  Bed to Chair: Independent              Patient Vitals for the past 6 hrs:   BP SpO2 Pulse   18 0205 131/74 96 % 66   18 0305 (!) 140/97 96 % (!) 56   18 0405 144/71 97 % (!) 58   18 0505 154/83 97 % (!) 57   18 0605 (!) 150/94 95 % (!) 56       Mental Status  Neurologic State: Alert  Orientation Level: Oriented X4  Cognition: Follows commands  Perception: Appears intact  Perseveration: No perseveration noted  Safety/Judgement: Awareness of environment                          Physical Skills Involved:  1. Balance  2. Strength  3. Activity Tolerance Cognitive Skills Affected (resulting in the inability to perform in a timely and safe manner): 1. none Psychosocial Skills Affected:  1. none   Number of elements that affect the Plan of Care: 1-3:  LOW COMPLEXITY   CLINICAL DECISION MAKIN [facility] [address] AM-PAC 6 Clicks   Daily Activity Inpatient Short Form  How much help from another person does the patient currently need. .. Total A Lot A Little None   1. Putting on and taking off regular lower body clothing? [] 1   [] 2   [] 3   [x] 4   2. Bathing (including washing, rinsing, drying)? [] 1   [] 2   [] 3   [x] 4   3. Toileting, which includes using toilet, bedpan or urinal?   [] 1   [] 2   [] 3   [x] 4   4. Putting on and taking off regular upper body clothing? [] 1   [] 2   [] 3   [x] 4   5.   Taking care of personal grooming such as brushing teeth? [] 1   [] 2   [] 3   [x] 4   6. Eating meals? [] 1   [] 2   [] 3   [x] 4   © 2007, Trustees of [address] [place] [address] [address] [address], under license to [employer]. All rights reserved      Score:  Initial: 24 Most Recent: X (Date: -- )    Interpretation of Tool:  Represents activities that are increasingly more difficult (i.e. Bed mobility, Transfers, Gait). Score 24 23 22-20 19-15 14-10 9-7 6     Modifier CH CI CJ CK CL CM CN      ? Self Care:     - CURRENT STATUS:  - 0% impaired, limited or restricted    - GOAL STATUS:  - 0% impaired, limited or restricted    - D/C STATUS:  [address] [place] [address] - 0% impaired, limited or restricted  Payor: Dipak Chapin / Plan: [address] / Product Type: PPO /      Medical Necessity:     · Discharge OT  Reason for Services/Other Comments:  · Discharge OT   Use of outcome tool(s) and clinical judgement create a POC that gives a: LOW COMPLEXITY         TREATMENT:   (In addition to Assessment/Re-Assessment sessions the following treatments were rendered)     Pre-treatment Symptoms/Complaints:  No complaints  Pain: Initial:   Pain Intensity 1: 0  Post Session:  0     Assessment/Reassessment only, no treatment provided today    Braces/Orthotics/Lines/Etc:   · telemetry  · O2 Device: Room air  Treatment/Session Assessment:    · Response to Treatment:  Tolerated well  · Interdisciplinary Collaboration:   o Physical Therapist  o Registered Nurse  · After treatment position/precautions:   o Supine in bed  o Bed/Chair-wheels locked  o Bed in low position  o Call light within reach  o RN notified  o Side rails x 3   · Compliance with Program/Exercises: compliant all of the time.   Recommendations/Intent for next treatment session:  Discharge OT  Total Treatment Duration:  OT Patient Time In/Time Out  Time In: 0720  Time Out: 1113 Jean Aguilar OT

## 2018-04-11 NOTE — ROUTINE PROCESS
Report called to Gabrielle Degroot RN in ICU. # 20 jelco via the right AC intact without swelling or redness.   Transported to ICU for remote Telemetry

## 2018-04-11 NOTE — PROGRESS NOTES
Speech pathology:     ST attempted however Neurology is in to evaluate patient. ST to re-attempt later this date. WENDY Lee

## 2018-04-11 NOTE — CONSULTS
Consult    Patient: Lam Sheridan Sr. MRN: [id number]  SSN: [id number]    YOB: 1970  Age: 41 y.o. Sex: male      Subjective:      Lam Sheridan Sr. is a 41 y.o. male who is being seen for  New onset right facial paralysis and some retro auricular paIn  Rt ear No hyperacusis  + recent URI and pneumonitis. Onset of symptoms was yesterday. Pt  + hx tic bite RLEthis occurred several weeks ago. No characteristic rash to suggest Lyme disease    Past Medical History:   Diagnosis Date    CAD (coronary artery disease)     Gastrointestinal disorder     gerd    Other ill-defined conditions(799.89)     slipped disk.  Psychiatric disorder     anxiety    TIA (transient ischemic attack) 4/10/2018     Past Surgical History:   Procedure Laterality Date    HX ORTHOPAEDIC      bilateral knee, right hand      History reviewed. No pertinent family history.   Social History   Substance Use Topics    Smoking status: Current Every Day Smoker     Packs/day: 1.50    Smokeless tobacco: Never Used    Alcohol use No      Current Facility-Administered Medications   Medication Dose Route Frequency Provider Last Rate Last Dose    pantoprazole (PROTONIX) tablet 40 mg  40 mg Oral ACB Karen Dowling MD        sodium chloride (NS) flush 5-10 mL  5-10 mL IntraVENous Q8H Karen Dowling MD   10 mL at 04/11/18 0600    sodium chloride (NS) flush 5-10 mL  5-10 mL IntraVENous PRN Karen Dowling MD        aspirin (ASPIRIN) tablet 325 mg  325 mg Oral DAILY Karen Dowling MD   325 mg at 04/10/18 2150    heparin (porcine) injection 5,000 Units  5,000 Units SubCUTAneous Q12H Karen Dowling MD        nicotine (NICODERM CQ) 21 mg/24 hr patch 1 Patch  1 Patch TransDERmal DAILY Karen Dowling MD   1 Patch at 04/10/18 2027    atorvastatin (LIPITOR) tablet 40 mg  40 mg Oral DAILY Karen Dowling MD   40 mg at 04/10/18 2026        No Known Allergies    Review of Systems  Negative neurological systems inventory including no dysarthria or dysphasia dysphagia no tremor noted difficulties with balance or coordination no cognitive difficulties no blackouts syncope no seizures cardiopulmonary no palpitations no chest pain dermatologic no rash  Objective:   MRI WNL  CTA WNL    Vitals:    04/11/18 0405 04/11/18 0505 04/11/18 0544 04/11/18 0605   BP: 144/71 154/83  (!) 150/94   Pulse: (!) 58 (!) 57  (!) 56   Resp: 13 21  15   Temp: 97.8 °F (36.6 °C)      SpO2: 97% 97%  95%   Weight:   105.4 kg (232 lb 4.8 oz)    Height:            Physical Exam:  Alert cooperative and oriented examination of the head and neck is unremarkable no Lhermitte sign. Carotids no bruit  Cranial nerves  Normal visual fields  Normal full extraocular movements  Dense right seventh nerve paralysis with incomplete eyelid closure  And involvement of the upper portion of the face. Tongue midline in the mouth  Motor examination demonstrates no focal drift weakness atrophy or asymmetry in tone  The deep tendon reflexes are 2+ and symmetric  Cerebellar functions finger to nose and heel-knee-shin testing unremarkable  General examination  Pulses regular. No evidence of ulceration or abnormality of the right tympanic membrane  Somewhat tender to percussion over the right mastoid bone  . Assessment:   [place] Palsy , right side. Classic presentation. Does not have hyperacusis but does have ear pain suggestive of nerve edema within the canal  There is no specific correlation with unilateral Bell's palsy and Lyme disease although there is a correlation between bilateral Bell's palsy and Lyme disease.   Hospital Problems  Never Reviewed          Codes Class Noted POA    TIA (transient ischemic attack) ICD-10-CM: G45.9  ICD-9-CM: 435.9  4/10/2018 Unknown        GERD (gastroesophageal reflux disease) ICD-10-CM: K21.9  ICD-9-CM: 530.81  4/10/2018 Unknown        Munoz's esophagus with dysplasia ICD-10-CM: K22.719  ICD-9-CM: 530.85  4/10/2018 Unknown              Plan:   D/C  Treatment per AAN recommendations with Famvir and Prednisone  Chk Lyme titer  LOW LEVEL of suspicion, and the only reason for checking it is his history of a tick bite 2 weeks ago  Eye patch  DISCUSSED CORNEAL protectionin detail must patch his eye at night.   Discussed appropriate supplemental eyedrops and Lacri-Lube at night    Follow-up with primary care        Signed By: Salo Wilkinson MD     April 11, 2018

## 2018-04-11 NOTE — PROGRESS NOTES
Hospitalist Progress Note     Admit Date:  4/10/2018  3:40 PM   Name:  Luisito Tapia. Age:  41 y.o.  :  1970   MRN:  [id number]   PCP:  No primary care provider on file. Treatment Team: Attending Provider: Sierra Olivares MD; Consulting Provider: Raydell Buerger, MD; Utilization Review: Yeny Johnson RN    Subjective:   Mr. Federico Garcia is a 42 yo male who presented with right sided facial numbness and inability to close his right eyelid fully. He denies any dyspnea or dysphagia today. Objective:   Patient Vitals for the past 24 hrs:   Temp Pulse Resp BP SpO2   18 0905 - 69 18 157/89 97 %   18 0700 97.8 °F (36.6 °C) 67 19 154/76 97 %   18 0605 - (!) 56 15 (!) 150/94 95 %   18 0505 - (!) 57 21 154/83 97 %   18 0405 97.8 °F (36.6 °C) (!) 58 13 144/71 97 %   18 0305 - (!) 56 16 (!) 140/97 96 %   18 0205 - 66 (!) 33 131/74 96 %   18 0105 - 72 15 158/74 94 %   18 0005 98.4 °F (36.9 °C) 62 16 147/78 93 %   04/10/18 2305 - 86 29 134/72 98 %   04/10/18 2205 - 72 30 153/86 97 %   04/10/18 2108 98.5 °F (36.9 °C) 71 22 145/88 98 %   04/10/18 2045 - 72 16 152/84 98 %   04/10/18 1842 - 74 16 156/80 -   04/10/18 1827 - 75 - 148/83 -   04/10/18 1812 - 75 - 158/88 -   04/10/18 1757 - 71 - 154/78 -   04/10/18 1533 97.9 °F (36.6 °C) (!) 102 18 134/83 97 %     Oxygen Therapy  O2 Sat (%): 97 % (18 0905)  Pulse via Oximetry: 62 beats per minute (18 0605)  O2 Device: Room air (18 0800)    Intake/Output Summary (Last 24 hours) at 18 1146  Last data filed at 18 0905   Gross per 24 hour   Intake              360 ml   Output                0 ml   Net              360 ml           General:                    Well nourished. Alert. Eyes:                                   Normal sclera. Extraocular movements intact. , unable to fully close right eye, right lip droop  ENT:                                    Normocephalic, atraumatic.   Moist mucous membranes  CV:                                      RRR. No murmur, rub, or gallop. Lungs:                       CTAB. No wheezing, rhonchi, or rales. Abdomen:                  Soft, nontender, nondistended. Bowel sounds normal.   Extremities:               Warm and dry. No cyanosis or edema. Neurologic:                CN II-XII grossly intact. Decreased sensation in V2,V3 distribution of right side of face though sensation still present, 5/5 strength in bilateral UL and LL  Skin:                                    No rashes or jaundice. Psych:                                 Normal mood and affect. Data Review:  I have reviewed all labs, meds, telemetry events, and studies from the last 24 hours. Recent Results (from the past 24 hour(s))   CBC WITH AUTOMATED DIFF    Collection Time: 04/10/18  4:36 PM   Result Value Ref Range    WBC 9.8 4.3 - 11.1 K/uL    RBC 5.26 4.23 - 5.67 M/uL    HGB 14.4 13.6 - 17.2 g/dL    HCT 44.3 41.1 - 50.3 %    MCV 84.2 79.6 - 97.8 FL    MCH 27.4 26.1 - 32.9 PG    MCHC 32.5 31.4 - 35.0 g/dL    RDW 12.9 11.9 - 14.6 %    PLATELET 998 633 - 221 K/uL    MPV 10.4 (L) 10.8 - 14.1 FL    DF AUTOMATED      NEUTROPHILS 61 43 - 78 %    LYMPHOCYTES 30 13 - 44 %    MONOCYTES 6 4.0 - 12.0 %    EOSINOPHILS 2 0.5 - 7.8 %    BASOPHILS 0 0.0 - 2.0 %    IMMATURE GRANULOCYTES 1 0.0 - 5.0 %    ABS. NEUTROPHILS 6.0 1.7 - 8.2 K/UL    ABS. LYMPHOCYTES 2.9 0.5 - 4.6 K/UL    ABS. MONOCYTES 0.6 0.1 - 1.3 K/UL    ABS. EOSINOPHILS 0.2 0.0 - 0.8 K/UL    ABS. BASOPHILS 0.0 0.0 - 0.2 K/UL    ABS. IMM.  GRANS. 0.1 0.0 - 0.5 K/UL   METABOLIC PANEL, COMPREHENSIVE    Collection Time: 04/10/18  4:36 PM   Result Value Ref Range    Sodium 141 136 - 145 mmol/L    Potassium 3.8 3.5 - 5.1 mmol/L    Chloride 105 98 - 107 mmol/L    CO2 26 21 - 32 mmol/L    Anion gap 10 7 - 16 mmol/L    Glucose 114 (H) 65 - 100 mg/dL    BUN 10 6 - 23 MG/DL    Creatinine 1.08 0.8 - 1.5 MG/DL    GFR est AA >60 >60 ml/min/1.73m2    GFR est non-AA >60 >60 ml/min/1.73m2    Calcium 8.5 8.3 - 10.4 MG/DL    Bilirubin, total 0.2 0.2 - 1.1 MG/DL    ALT (SGPT) 25 12 - 65 U/L    AST (SGOT) 19 15 - 37 U/L    Alk. phosphatase 114 50 - 136 U/L    Protein, total 7.2 6.3 - 8.2 g/dL    Albumin 3.4 (L) 3.5 - 5.0 g/dL    Globulin 3.8 (H) 2.3 - 3.5 g/dL    A-G Ratio 0.9 (L) 1.2 - 3.5     PROTHROMBIN TIME + INR    Collection Time: 04/10/18  4:36 PM   Result Value Ref Range    Prothrombin time 12.6 11.5 - 14.5 sec    INR 0.9     EKG, 12 LEAD, INITIAL    Collection Time: 04/10/18  5:57 PM   Result Value Ref Range    Ventricular Rate 75 BPM    Atrial Rate 75 BPM    P-R Interval 162 ms    QRS Duration 86 ms    Q-T Interval 372 ms    QTC Calculation (Bezet) 415 ms    Calculated P Axis 70 degrees    Calculated R Axis 83 degrees    Calculated T Axis 128 degrees    Diagnosis       !! AGE AND GENDER SPECIFIC ECG ANALYSIS !!   Normal sinus rhythm  Non-specific ST-t wave changes  Abnormal ECG  No previous ECGs available  Confirmed by ST CHIARA SANDHU MD (), ARCADIO LOPEZ ([id number]) on 4/10/2018 9:26:14 PM     LIPID PANEL    Collection Time: 04/11/18  4:12 AM   Result Value Ref Range    LIPID PROFILE          Cholesterol, total 184 <200 MG/DL    Triglyceride 170 (H) 35 - 150 MG/DL    HDL Cholesterol 31 (L) 40 - 60 MG/DL    LDL, calculated 119 (H) <100 MG/DL    VLDL, calculated 34 (H) 6.0 - 23.0 MG/DL    CHOL/HDL Ratio 5.9     HEMOGLOBIN A1C WITH EAG    Collection Time: 04/11/18  4:12 AM   Result Value Ref Range    Hemoglobin A1c 5.4 4.8 - 6.0 %    Est. average glucose 108 mg/dL   CBC WITH AUTOMATED DIFF    Collection Time: 04/11/18  4:12 AM   Result Value Ref Range    WBC 8.5 4.3 - 11.1 K/uL    RBC 5.12 4.23 - 5.67 M/uL    HGB 14.0 13.6 - 17.2 g/dL    HCT 43.4 41.1 - 50.3 %    MCV 84.8 79.6 - 97.8 FL    MCH 27.3 26.1 - 32.9 PG    MCHC 32.3 31.4 - 35.0 g/dL    RDW 12.9 11.9 - 14.6 %    PLATELET 629 722 - 894 K/uL    MPV 10.4 (L) 10.8 - 14.1 FL    DF AUTOMATED      NEUTROPHILS 52 43 - 78 % LYMPHOCYTES 39 13 - 44 %    MONOCYTES 7 4.0 - 12.0 %    EOSINOPHILS 2 0.5 - 7.8 %    BASOPHILS 0 0.0 - 2.0 %    IMMATURE GRANULOCYTES 0 0.0 - 5.0 %    ABS. NEUTROPHILS 4.3 1.7 - 8.2 K/UL    ABS. LYMPHOCYTES 3.4 0.5 - 4.6 K/UL    ABS. MONOCYTES 0.6 0.1 - 1.3 K/UL    ABS. EOSINOPHILS 0.2 0.0 - 0.8 K/UL    ABS. BASOPHILS 0.0 0.0 - 0.2 K/UL    ABS. IMM. GRANS. 0.0 0.0 - 0.5 K/UL   METABOLIC PANEL, COMPREHENSIVE    Collection Time: 04/11/18  4:12 AM   Result Value Ref Range    Sodium 142 136 - 145 mmol/L    Potassium 4.3 3.5 - 5.1 mmol/L    Chloride 105 98 - 107 mmol/L    CO2 29 21 - 32 mmol/L    Anion gap 8 7 - 16 mmol/L    Glucose 104 (H) 65 - 100 mg/dL    BUN 10 6 - 23 MG/DL    Creatinine 1.02 0.8 - 1.5 MG/DL    GFR est AA >60 >60 ml/min/1.73m2    GFR est non-AA >60 >60 ml/min/1.73m2    Calcium 8.6 8.3 - 10.4 MG/DL    Bilirubin, total 0.2 0.2 - 1.1 MG/DL    ALT (SGPT) 25 12 - 65 U/L    AST (SGOT) 18 15 - 37 U/L    Alk.  phosphatase 110 50 - 136 U/L    Protein, total 6.8 6.3 - 8.2 g/dL    Albumin 3.1 (L) 3.5 - 5.0 g/dL    Globulin 3.7 (H) 2.3 - 3.5 g/dL    A-G Ratio 0.8 (L) 1.2 - 3.5     TSH 3RD GENERATION    Collection Time: 04/11/18  4:12 AM   Result Value Ref Range    TSH 0.015 (L) 0.358 - 3.740 uIU/mL        All Micro Results     None          Current Meds:  Current Facility-Administered Medications   Medication Dose Route Frequency    acyclovir (ZOVIRAX) capsule 200 mg  200 mg Oral 5XD    predniSONE (DELTASONE) tablet 10 mg  10 mg Oral 6-DAY DOSEPAK TAPER    predniSONE (DELTASONE) tablet 10 mg  10 mg Oral 6-DAY DOSEPAK TAPER    predniSONE (DELTASONE) tablet 20 mg  20 mg Oral 6-DAY DOSEPAK TAPER    [START ON 4/12/2018] predniSONE (DELTASONE) tablet 10 mg  10 mg Oral 6-DAY DOSEPAK TAPER    [START ON 4/12/2018] predniSONE (DELTASONE) tablet 10 mg  10 mg Oral 6-DAY DOSEPAK TAPER    [START ON 4/12/2018] predniSONE (DELTASONE) tablet 10 mg  10 mg Oral 6-DAY DOSEPAK TAPER    [START ON 4/12/2018] predniSONE (DELTASONE) tablet 20 mg  20 mg Oral 6-DAY DOSEPAK TAPER    [START ON 4/13/2018] predniSONE (DELTASONE) tablet 10 mg  10 mg Oral 6-DAY DOSEPAK TAPER    [START ON 4/13/2018] predniSONE (DELTASONE) tablet 10 mg  10 mg Oral 6-DAY DOSEPAK TAPER    [START ON 4/13/2018] predniSONE (DELTASONE) tablet 10 mg  10 mg Oral 6-DAY DOSEPAK TAPER    [START ON 4/13/2018] predniSONE (DELTASONE) tablet 10 mg  10 mg Oral 6-DAY DOSEPAK TAPER    [START ON 4/14/2018] predniSONE (DELTASONE) tablet 10 mg  10 mg Oral 6-DAY DOSEPAK TAPER    [START ON 4/14/2018] predniSONE (DELTASONE) tablet 10 mg  10 mg Oral 6-DAY DOSEPAK TAPER    [START ON 4/14/2018] predniSONE (DELTASONE) tablet 10 mg  10 mg Oral 6-DAY DOSEPAK TAPER    [START ON 4/15/2018] predniSONE (DELTASONE) tablet 10 mg  10 mg Oral 6-DAY DOSEPAK TAPER    [START ON 4/15/2018] predniSONE (DELTASONE) tablet 10 mg  10 mg Oral 6-DAY DOSEPAK TAPER    [START ON 4/16/2018] predniSONE (DELTASONE) tablet 10 mg  10 mg Oral 6-DAY DOSEPAK TAPER    polyvinyl alcohol (LIQUIFILM TEARS) 1.4 % ophthalmic solution 1 Drop  1 Drop Both Eyes PRN    white Petrolatum-Mineral Oil (AKWA TEARS) ophthalmic ointment   Both Eyes PRN    perflutren lipid microspheres (DEFINITY) in NS bolus IV  1 mL IntraVENous PRN    pantoprazole (PROTONIX) tablet 40 mg  40 mg Oral ACB    sodium chloride (NS) flush 5-10 mL  5-10 mL IntraVENous Q8H    sodium chloride (NS) flush 5-10 mL  5-10 mL IntraVENous PRN    aspirin (ASPIRIN) tablet 325 mg  325 mg Oral DAILY    heparin (porcine) injection 5,000 Units  5,000 Units SubCUTAneous Q12H    nicotine (NICODERM CQ) 21 mg/24 hr patch 1 Patch  1 Patch TransDERmal DAILY    atorvastatin (LIPITOR) tablet 40 mg  40 mg Oral DAILY       Other Studies (last 24 hours):  Xr Chest Pa Lat    Result Date: 4/10/2018  Two view chest:  04/10/2018 History: Acute right facial numbness. Comparison: None Findings: The heart and mediastinal silhouette are normal in size and configuration. The lungs and pleural spaces are clear. The pulmonary vascularity is within normal limits. The visualized osseous structures are unremarkable. Impression: No active disease in the chest.     Mri Brain W Wo Cont    Result Date: 4/10/2018  MRI of the brain with and without contrast. CLINICAL INDICATION: Right-sided facial numbness PROCEDURE: Multiplanar and multisequence MR imaging performed through the brain prior to and following the administration of 20 cc of MultiHance intravenous gadolinium contrast. COMPARISON: Head CT from earlier the same day FINDINGS: No foci of abnormal restricted diffusion appreciated. There is no acute intracranial hemorrhage, mass, or mass effect. There is no hydrocephalus. No abnormal extra-axial fluid collection appreciated. The gray-white brain parenchymal interface is maintained. The included paranasal sinuses show diffuse mucoperiosteal thickening in the maxillary sinuses. The mastoid air cells are clear. The marrow signal in the calvaria and skull base is normal. The critical junction is unremarkable. Postcontrast imaging shows no abnormal intraparenchymal or leptomeningeal enhancement. The dural venous sinuses are patent throughout. IMPRESSION: Unremarkable unenhanced and enhanced MRI of the brain., No findings to indicate acute CVA. Ct Head Wo Cont    Result Date: 4/10/2018  CT SCAN OF THE BRAIN 4/10/2018 Indication:  Right facial numbness TECHNIQUE:  Axial scans from skull base to vertex. No contrast.  Radiation dose reduction techniques were used for this study: All CT scans performed at this facility use one or all of the following: Automated exposure control, adjustment of the mA and/or kVp according to patient's size, iterative reconstruction. COMPARISON:  There are no recent previous comparable exams on this PACs system. Findings: There is no evidence for mass, acute hemorrhage, or midline shift of the brain. Ventricles are normal size.   No abnormal fluid collection is identified. No definite evidence for acute major arterial territory infarct. Skull appears intact. Mucosal thickening noted paranasal sinuses. IMPRESSION:  No acute CT findings in the brain. Cta Head Neck W Wo Cont    Result Date: 4/11/2018  History: Right facial numbness and weakness. FINDINGS: CT angiography was performed of the neck and head with contrast and three-dimensional CT angiography reconstruction and reformat was performed. NASCET criteria as needed. CT dose reduction was achieved through use of a standardized protocol tailored for this examination and automatic exposure control for dose modulation. Aortic arch is patent. The left common carotid artery is patent. The left internal carotid artery is patent. The left middle cerebral artery is patent. The innominate artery is patent. The right common carotid artery is patent. The right internal carotid artery is patent. The middle cerebral artery on the right is patent. The anterior cerebral arteries are patent. The left vertebral artery is patent. The right vertebral artery is diffusely small but contiguous. The basilar artery is patent. The imaged portions of the posterior cerebral arteries are patent bilaterally. Bilateral maxillary sinus disease. Ethmoid sinus and frontal sinus disease as well. IMPRESSION: No acute arterial findings. No hemodynamically significant carotid artery stenosis.          Review of Systems:  Gen: No weight loss  Eyes: no vision changes  ENT: no sore throat  Resp: No dyspnea or cough  CV: No chest pain  Abd:  no abd pain, no vomiting or diarrhea  : No incontinence, no hematuria or dysuria, no flank pain  MSK: no leg swelling  Neuro: No HA or dizziness, no weakness, + numbness/tingling    Assessment and Plan:     Hospital Problems as of 4/11/2018  Never Reviewed          Codes Class Noted - Resolved POA    TIA (transient ischemic attack) ICD-10-CM: G45.9  ICD-9-CM: 435.9  4/10/2018 - Present Unknown GERD (gastroesophageal reflux disease) ICD-10-CM: K21.9  ICD-9-CM: 530.81  4/10/2018 - Present Unknown        Munoz's esophagus with dysplasia ICD-10-CM: K22.719  ICD-9-CM: 530.85  4/10/2018 - Present Unknown              PLAN:    Bell's palsy  Seen by Neurology and diagnosed with bell's palsy  Will continue aspirin and statin on discharge given current risk factors for future TIA/Stroke  AAN recommendations with Famvir and Prednisone  Chk Lyme titer low of suspicion, and the only reason for checking it is his history of a tick bite 2 weeks ago  Eye patch  Corneal protection, must patch his eye at night.   Discussed appropriate supplemental eyedrops and Lacri-Lube at night         DC planning/Dispo:  Discharge home today  DVT ppx:  heparin    Signed:  Flakito Clifford MD

## 2018-04-11 NOTE — ED NOTES
(ELIGIO) report received from Bianca Rinaldi, [facility].   NAD at the present time

## 2018-04-11 NOTE — PROGRESS NOTES
Problem: Mobility Impaired (Adult and Pediatric)  Goal: *Acute Goals and Plan of Care (Insert Text)  No further PT warranted since pt is functionally independent and has no significant strength or coordination deficits    PHYSICAL THERAPY: Initial Assessment, Discharge, AM 4/11/2018  OBSERVATION: Hospital Day: 2  Payor: Elois Schlatter / Plan: [address] / Product Type: PPO /      NAME/AGE/GENDER: Therese Ram Sr. is a 41 y.o. male   PRIMARY DIAGNOSIS: TIA (transient ischemic attack) <principal problem not specified> <principal problem not specified>        ICD-10: Treatment Diagnosis:    · Generalized Muscle Weakness (M62.81)   Precaution/Allergies:  Review of patient's allergies indicates no known allergies. ASSESSMENT:     Mr. Nima Camp presents with TIA and has mild strength deficit in B LEs and a notable R sided facial droop, he does complain that his R eye burns as well. His R eye was a little droopy at first, but when we got him up and in the light, it looked fine. His mild strength deficit does not hinder his independence with functional mobility. His plan is to go home with his mother at [place]. He does not need any further PT services or DME. This section established at most recent assessment   PROBLEM LIST (Impairments causing functional limitations):  1. Decreased Strength   INTERVENTIONS PLANNED: (Benefits and precautions of physical therapy have been discussed with the patient.)  1. none as he is functionally independent     TREATMENT PLAN: Frequency/Duration:No further PT recommended  Rehabilitation Potential For Stated Goals: no further PT recommended     RECOMMENDED REHABILITATION/EQUIPMENT: (at time of discharge pending progress): Due to the probability of continued deficits (see above) this patient will not likely need continued skilled physical therapy after discharge.   Equipment:    None at this time              HISTORY:   History of Present Injury/Illness (Reason for Referral): Admitted with a TIA with R facial numbness  Past Medical History/Comorbidities:   Mr. Cailin Salcido  has a past medical history of CAD (coronary artery disease); Gastrointestinal disorder; Other ill-defined conditions(799.89); Psychiatric disorder; and TIA (transient ischemic attack) (4/10/2018). He also has no past medical history of Arthritis; Asthma; Autoimmune disease ([address] Cancer ([address] Chronic kidney disease; COPD; DEMENTIA; Diabetes ([address] Endocrine disease; Heart failure ([address] Hypertension; Infectious disease; Liver disease; PUD (peptic ulcer disease); Seizures ([address] Sleep disorder; or Thromboembolus ([address] Mr. Cailin Salcido  has a past surgical history that includes hx orthopaedic. Social History/Living Environment:   Home Environment: Trailer/mobile home  # Steps to Enter: 4  Hand Rails : Right  One/Two Story Residence: One story  Living Alone: No  Support Systems: Parent  Patient Expects to be Discharged to[de-identified] Trailer/mobile home  Current DME Used/Available at Home: None  Tub or Shower Type: Tub/Shower combination  Prior Level of Function/Work/Activity:  Functionally independent with ADls and amb. Drives a truck for work  Dominant Side:         RIGHT   Number of Personal Factors/Comorbidities that affect the Plan of Care: 0: LOW COMPLEXITY   EXAMINATION:   Most Recent Physical Functioning:   Gross Assessment:  AROM: Within functional limits (B LEs)  Strength: Generally decreased, functional (B LE s 4/5)  Coordination: Within functional limits (B LEs)  Tone: Normal (B LEs)  Sensation: Intact (B LEs)               Posture:     Balance:  Sitting: Intact  Standing: Intact Bed Mobility:  Rolling: Independent  Supine to Sit: Independent  Sit to Supine: Independent  Scooting: Independent  Wheelchair Mobility:     Transfers:  Sit to Stand: Independent  Stand to Sit: Independent  Gait:     Distance (ft): 150 Feet (ft)  Ambulation - Level of Assistance: Independent      Body Structures Involved:  1.  Muscles Body Functions Affected:  1. Movement Related Activities and Participation Affected:  1. None   Number of elements that affect the Plan of Care: 1-2: LOW COMPLEXITY   CLINICAL PRESENTATION:   Presentation: Stable and uncomplicated: LOW COMPLEXITY   CLINICAL DECISION MAKIN [facility] [address] [id number] AM-PAC 6 Clicks   Basic Mobility Inpatient Short Form  How much difficulty does the patient currently have. .. Unable A Lot A Little None   1. Turning over in bed (including adjusting bedclothes, sheets and blankets)? [] 1   [] 2   [] 3   [x] 4   2. Sitting down on and standing up from a chair with arms ( e.g., wheelchair, bedside commode, etc.)   [] 1   [] 2   [] 3   [x] 4   3. Moving from lying on back to sitting on the side of the bed? [] 1   [] 2   [] 3   [x] 4   How much help from another person does the patient currently need. .. Total A Lot A Little None   4. Moving to and from a bed to a chair (including a wheelchair)? [] 1   [] 2   [] 3   [x] 4   5. Need to walk in hospital room? [] 1   [] 2   [] 3   [x] 4   6. Climbing 3-5 steps with a railing? [] 1   [] 2   [x] 3   [] 4   © , Trustees of [address] [place] [address] [address] [id number], under license to [employer]. All rights reserved      Score:  Initial:23 Most Recent: X (Date: -- )    Interpretation of Tool:  Represents activities that are increasingly more difficult (i.e. Bed mobility, Transfers, Gait). Score 24 23 22-20 19-15 14-10 9-7 6     Modifier CH CI CJ CK CL CM CN      ? Mobility - Walking and Moving Around:     - CURRENT STATUS: CI - 1%-19% impaired, limited or restricted    - GOAL STATUS: CI - 1%-19% impaired, limited or restricted    - D/C STATUS:  CI - 1%-19% impaired, limited or restricted  Payor: [employer] / Plan: [address] / Product Type: PPO /      Medical Necessity:     · No further PT indicated. Reason for Services/Other Comments:  · No further PT indicated.    Use of outcome tool(s) and clinical judgement create a POC that gives a: Clear prediction of patient's progress: LOW COMPLEXITY            TREATMENT:   (In addition to Assessment/Re-Assessment sessions the following treatments were rendered)   Pre-treatment Symptoms/Complaints:  none  Pain: Initial:   Pain Intensity 1: 0  Post Session:  0     Assessment/Reassessment only, no treatment provided today    Braces/Orthotics/Lines/Etc:   · telemetry lines  Treatment/Session Assessment:    · Response to Treatment:  Tolerated well. No PT deficits seen  · Interdisciplinary Collaboration:   o Physical Therapist  o Occupational Therapist  o Registered Nurse  · After treatment position/precautions:   o Supine in bed  o Bed/Chair-wheels locked  o Bed in low position  o RN notified  o Side rails x 2   · Compliance with Program/Exercises: compliant all of the time.   · Recommendations/Intent for next treatment session:  DC PT due to functional independence  Total Treatment Duration:  PT Patient Time In/Time Out  Time In: 0710  Time Out: [address], PT

## 2018-04-11 NOTE — INTERDISCIPLINARY ROUNDS
Interdisciplinary team rounds were held 4/11/2018 with the following team members:Care Management, Nursing, Physical Therapy and Physician and the patient. Plan of care discussed. See clinical pathway and/or care plan for interventions and desired outcomes.

## 2018-04-11 NOTE — PROGRESS NOTES
TRANSFER - IN REPORT:    Verbal report received from Asia Quan RN on Gerry Hilton Sr.  being received from ED for routine progression of care      Report consisted of patients Situation, Background, Assessment and   Recommendations(SBAR). Information from the following report(s) SBAR and ED Summary was reviewed with the receiving nurse. Opportunity for questions and clarification was provided. Assessment completed upon patients arrival to unit and care assumed.

## 2018-04-11 NOTE — PROGRESS NOTES
I have reviewed discharge instructions with the patient. The patient verbalized understanding. Prescriptions provided to patient. PIV removed. Pt able to dress self. All belongings sent home with pt. Pt escorted to vehicle and discharged home.

## 2018-04-11 NOTE — PROGRESS NOTES
Late entry due to hands on patient care. Admitted to [facility] from ED. Arrived via wheelchair without cardiac monitor. Connected to ICU cardiac monitor. Alert and oriented X 4. Patient with right side facial numbness, visible muscle weakness. Speech clear. No drifts noted to extremities. Grasp equal bilateral. Dual skin assessment completed with Jacquelin Arriaga RN, skin intact, no redden or broken areas noted. Several tattoos to body. Tongue ring present. Educated on use of call bell, television control, and bed controls. Bed in low position. Instructed to call nursing for any assistance.

## 2018-04-12 LAB — B BURGDOR IGG+IGM SER-ACNC: <0.91 ISR (ref 0–0.9)
